# Patient Record
Sex: MALE | Race: WHITE | NOT HISPANIC OR LATINO | Employment: OTHER | URBAN - METROPOLITAN AREA
[De-identification: names, ages, dates, MRNs, and addresses within clinical notes are randomized per-mention and may not be internally consistent; named-entity substitution may affect disease eponyms.]

---

## 2023-12-14 ENCOUNTER — HOSPITAL ENCOUNTER (EMERGENCY)
Facility: HOSPITAL | Age: 68
Discharge: HOME | End: 2023-12-14
Attending: EMERGENCY MEDICINE
Payer: MEDICARE

## 2023-12-14 ENCOUNTER — APPOINTMENT (EMERGENCY)
Dept: RADIOLOGY | Facility: HOSPITAL | Age: 68
End: 2023-12-14
Payer: MEDICARE

## 2023-12-14 VITALS
RESPIRATION RATE: 18 BRPM | TEMPERATURE: 98.1 F | OXYGEN SATURATION: 97 % | SYSTOLIC BLOOD PRESSURE: 135 MMHG | HEART RATE: 84 BPM | HEIGHT: 70 IN | WEIGHT: 240 LBS | BODY MASS INDEX: 34.36 KG/M2 | DIASTOLIC BLOOD PRESSURE: 69 MMHG

## 2023-12-14 DIAGNOSIS — S41.112A ARM LACERATION, LEFT, INITIAL ENCOUNTER: ICD-10-CM
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
## 2023-12-14 PROCEDURE — 99284 EMERGENCY DEPT VISIT MOD MDM: CPT | Performed by: PHYSICIAN ASSISTANT

## 2023-12-14 PROCEDURE — 90715 TDAP VACCINE 7 YRS/> IM: CPT | Performed by: PHYSICIAN ASSISTANT

## 2023-12-14 PROCEDURE — 99283 EMERGENCY DEPT VISIT LOW MDM: CPT

## 2023-12-14 PROCEDURE — 73090 X-RAY EXAM OF FOREARM: CPT

## 2023-12-14 PROCEDURE — 12002 RPR S/N/AX/GEN/TRNK2.6-7.5CM: CPT | Performed by: PHYSICIAN ASSISTANT

## 2023-12-14 PROCEDURE — 90471 IMMUNIZATION ADMIN: CPT

## 2023-12-14 RX ORDER — IBUPROFEN 400 MG/1
400 TABLET ORAL EVERY 6 HOURS PRN
Qty: 30 TABLET | Refills: 0 | Status: SHIPPED | OUTPATIENT
Start: 2023-12-14

## 2023-12-14 RX ORDER — ACETAMINOPHEN 325 MG/1
975 TABLET ORAL ONCE
Status: COMPLETED | OUTPATIENT
Start: 2023-12-14 | End: 2023-12-14

## 2023-12-14 RX ORDER — AMOXICILLIN AND CLAVULANATE POTASSIUM 875; 125 MG/1; MG/1
1 TABLET, FILM COATED ORAL EVERY 12 HOURS
Qty: 14 TABLET | Refills: 0 | Status: SHIPPED | OUTPATIENT
Start: 2023-12-14 | End: 2023-12-21

## 2023-12-14 RX ORDER — LIDOCAINE HYDROCHLORIDE AND EPINEPHRINE 10; 10 MG/ML; UG/ML
20 INJECTION, SOLUTION INFILTRATION; PERINEURAL ONCE
Status: COMPLETED | OUTPATIENT
Start: 2023-12-14 | End: 2023-12-14

## 2023-12-14 RX ORDER — IBUPROFEN 400 MG/1
800 TABLET ORAL ONCE
Status: COMPLETED | OUTPATIENT
Start: 2023-12-14 | End: 2023-12-14

## 2023-12-14 RX ORDER — ACETAMINOPHEN 500 MG
500 TABLET ORAL EVERY 6 HOURS PRN
Qty: 30 TABLET | Refills: 0 | Status: SHIPPED | OUTPATIENT
Start: 2023-12-14

## 2023-12-14 RX ADMIN — LIDOCAINE HYDROCHLORIDE,EPINEPHRINE BITARTRATE 20 ML: 10; .01 INJECTION, SOLUTION INFILTRATION; PERINEURAL at 17:07

## 2023-12-14 RX ADMIN — ACETAMINOPHEN 975 MG: 325 TABLET, FILM COATED ORAL at 15:57

## 2023-12-14 RX ADMIN — IBUPROFEN 800 MG: 400 TABLET, FILM COATED ORAL at 15:57

## 2023-12-14 RX ADMIN — TETANUS TOXOID, REDUCED DIPHTHERIA TOXOID AND ACELLULAR PERTUSSIS VACCINE, ADSORBED 0.5 ML: 5; 2.5; 8; 8; 2.5 SUSPENSION INTRAMUSCULAR at 15:58

## 2023-12-14 NOTE — ED PROVIDER NOTES
History  Chief Complaint   Patient presents with    Dog Bite     Pt to er with reports of granddaughter's dog biting him on his left forearm - dog is up to date on shots. Tetanus not up to date. Dog Bite  Contact animal:  Dog  Location:  Shoulder/arm  Shoulder/arm injury location:  L forearm  Time since incident:  1 hour  Pain details:     Quality:  Aching    Severity:  Mild    Timing:  Constant    Progression:  Unchanged  Incident location:  Another residence  Notifications:  None  Animal's rabies vaccination status:  Up to date  Animal in possession: yes    Tetanus status:  Out of date  Associated symptoms: no fever        None       History reviewed. No pertinent past medical history. History reviewed. No pertinent surgical history. History reviewed. No pertinent family history. I have reviewed and agree with the history as documented. E-Cigarette/Vaping     E-Cigarette/Vaping Substances     Social History     Tobacco Use    Smoking status: Never    Smokeless tobacco: Never   Substance Use Topics    Alcohol use: Never    Drug use: Never       Review of Systems   Constitutional:  Negative for fever. Musculoskeletal:  Positive for arthralgias. Skin:  Positive for wound. All other systems reviewed and are negative. Physical Exam  Physical Exam  Vitals reviewed. Constitutional:       Appearance: Normal appearance. HENT:      Head: Normocephalic. Right Ear: External ear normal.      Left Ear: External ear normal.      Nose: Nose normal.      Mouth/Throat:      Pharynx: Oropharynx is clear. Eyes:      Conjunctiva/sclera: Conjunctivae normal.   Cardiovascular:      Rate and Rhythm: Normal rate. Pulses: Normal pulses. Pulmonary:      Effort: Pulmonary effort is normal.   Abdominal:      General: Bowel sounds are normal.   Musculoskeletal:         General: Tenderness present. Normal range of motion. Arms:       Cervical back: Normal range of motion.    Skin:     General: Skin is warm. Capillary Refill: Capillary refill takes less than 2 seconds. Neurological:      General: No focal deficit present. Mental Status: He is alert and oriented to person, place, and time. Sensory: No sensory deficit. Motor: No weakness.    Psychiatric:         Mood and Affect: Mood normal.         Vital Signs  ED Triage Vitals   Temperature Pulse Respirations Blood Pressure SpO2   12/14/23 1433 12/14/23 1433 12/14/23 1433 12/14/23 1433 12/14/23 1433   98.1 °F (36.7 °C) 84 18 135/69 97 %      Temp Source Heart Rate Source Patient Position - Orthostatic VS BP Location FiO2 (%)   12/14/23 1433 12/14/23 1433 12/14/23 1433 12/14/23 1433 --   Oral Monitor Sitting Left arm       Pain Score       12/14/23 1557       6           Vitals:    12/14/23 1433   BP: 135/69   Pulse: 84   Patient Position - Orthostatic VS: Sitting         Visual Acuity      ED Medications  Medications   tetanus-diphtheria-acellular pertussis (BOOSTRIX) IM injection 0.5 mL (0.5 mL Intramuscular Given 12/14/23 1558)   ibuprofen (MOTRIN) tablet 800 mg (800 mg Oral Given 12/14/23 1557)   acetaminophen (TYLENOL) tablet 975 mg (975 mg Oral Given 12/14/23 1557)   lidocaine-epinephrine (XYLOCAINE/EPINEPHRINE) 1 %-1:100,000 injection 20 mL (20 mL Infiltration Given by Other 12/14/23 1707)       Diagnostic Studies  Results Reviewed       None                   XR forearm 2 views LEFT   ED Interpretation by Gatito Barrett PA-C (12/14 7867)   No bone involvment         by Denis Nice MD (44/26 [id number])                 Procedures  Universal Protocol:  Patient understanding: patient states understanding of the procedure being performed  Patient consent: the patient's understanding of the procedure matches consent given  Patient identity confirmed: verbally with patient and arm band  Laceration repair    Date/Time: 12/14/2023 4:51 PM    Performed by: Gatito Barrett PA-C  Authorized by: Gatito Barrett PA-C  Body area: upper extremity  Location details: left lower arm  Laceration length: 3 cm  Anesthesia: local infiltration    Anesthesia:  Local Anesthetic: lidocaine 1% with epinephrine      Procedure Details:  Skin closure: 4-0 nylon and glue  Number of sutures: 2  Approximation: loose  Approximation difficulty: simple  Dressing: 4x4 sterile gauze               ED Course                               SBIRT 22yo+      Flowsheet Row Most Recent Value   Initial Alcohol Screen: US AUDIT-C     1. How often do you have a drink containing alcohol? 0 Filed at: 12/14/2023 1433   2. How many drinks containing alcohol do you have on a typical day you are drinking? 0 Filed at: 12/14/2023 1433   3a. Male UNDER 65: How often do you have five or more drinks on one occasion? 0 Filed at: 12/14/2023 1433   3b. FEMALE Any Age, or MALE 65+: How often do you have 4 or more drinks on one occassion? 0 Filed at: 12/14/2023 1433   Audit-C Score 0 Filed at: 12/14/2023 1433   ANNETTE: How many times in the past year have you. .. Used an illegal drug or used a prescription medication for non-medical reasons? Never Filed at: 12/14/2023 1433                      Medical Decision Making  77-year-old male presents emergency department for dog bite to left forearm. Patient states that dog is daughter and granddaughters animal.  Dog is up-to-date with its immunizations. Patient states that he needs to be updated with his tetanus. X-rays obtained no bony involvement. Patient had a significant laceration to the left forearm dorsum radial side. 2 puncture marks on the volar forearm. After adequate local anesthetic the wound was cleaned with simple soap and water. Patient had skin flap which was approximated with glue. The open deep wound was approximated with 2 sutures loosely. The puncture marks on the volar forearm were allowed to heal on their own with no suture were applied. Patient educated on diagnosis and home management.   Patient educated that he should treat with antibiotics and antibiotics were sent to his known pharmacy. Educated on timing for healing and suture removal.  Educated on any signs or symptoms of infection to return to the emergency department. Patient had no changes of sensation at the hand. Had full  strength at the hand. Educated patient and wife of home management and if any concern to either follow-up with orthopedic hand and/or return to the emergency department. Patient and wife admit understanding and agreement. Amount and/or Complexity of Data Reviewed  Radiology: ordered and independent interpretation performed. Risk  OTC drugs. Prescription drug management. Disposition  Final diagnoses:   Dog bite, initial encounter   Arm laceration, left, initial encounter     Time reflects when diagnosis was documented in both MDM as applicable and the Disposition within this note       Time User Action Codes Description Comment    12/14/2023  4:50 PM Frantz Weber ShadyNohemy. 0XXA] Dog bite, initial encounter     12/14/2023  5:41 PM Frantz Lua Add [G38.004R] Arm laceration, left, initial encounter           ED Disposition       ED Disposition   Discharge    Condition   Stable    Date/Time   Thu Dec 14, 2023 [facility] discharge to home/self care.                    Follow-up Information       Follow up With Specialties Details [address], MD Orthopedic Surgery, Hand Surgery   [facility]  [phone]              Discharge Medication List as of 12/14/2023  5:42 PM        START taking these medications    Details   acetaminophen (TYLENOL) 500 mg tablet Take 1 tablet (500 mg total) by mouth every 6 (six) hours as needed for mild pain or moderate pain, Starting Thu 12/14/2023, Print      amoxicillin-clavulanate (AUGMENTIN) 875-125 mg per tablet Take 1 tablet by mouth every 12 (twelve) hours for 7 days, Starting Thu 12/14/2023, Until Thu 12/21/2023, Normal      ibuprofen (MOTRIN) 400 mg tablet Take 1 tablet (400 mg total) by mouth every 6 (six) hours as needed for mild pain or moderate pain, Starting Thu 12/14/2023, Print             No discharge procedures on file.     PDMP Review       None            ED Provider  Electronically Signed by             Ana Villagomez PA-C  12/15/23 5775

## 2024-10-10 ENCOUNTER — APPOINTMENT (OUTPATIENT)
Dept: URGENT CARE | Facility: CLINIC | Age: 69
End: 2024-10-10

## 2024-12-27 ENCOUNTER — HOSPITAL ENCOUNTER (EMERGENCY)
Facility: HOSPITAL | Age: 69
Discharge: HOME | End: 2024-12-27
Attending: EMERGENCY MEDICINE
Payer: MEDICARE

## 2024-12-27 ENCOUNTER — APPOINTMENT (OUTPATIENT)
Dept: RADIOLOGY | Facility: HOSPITAL | Age: 69
End: 2024-12-27
Payer: MEDICARE

## 2024-12-27 VITALS
OXYGEN SATURATION: 96 % | DIASTOLIC BLOOD PRESSURE: 79 MMHG | TEMPERATURE: 97.4 F | SYSTOLIC BLOOD PRESSURE: 122 MMHG | HEART RATE: 83 BPM | RESPIRATION RATE: 20 BRPM

## 2024-12-27 DIAGNOSIS — R05.9 COUGH: ICD-10-CM

## 2024-12-27 DIAGNOSIS — R93.89 ABNORMAL CXR: ICD-10-CM

## 2024-12-27 DIAGNOSIS — J18.9 PNEUMONIA: Primary | ICD-10-CM

## 2024-12-27 LAB
FLUAV AG UPPER RESP QL IA.RAPID: NEGATIVE
FLUBV AG UPPER RESP QL IA.RAPID: NEGATIVE
S PYO DNA THROAT QL NAA+PROBE: NOT DETECTED
SARS-COV+SARS-COV-2 AG RESP QL IA.RAPID: NEGATIVE

## 2024-12-27 PROCEDURE — 87804 INFLUENZA ASSAY W/OPTIC: CPT | Performed by: EMERGENCY MEDICINE

## 2024-12-27 PROCEDURE — 71046 X-RAY EXAM CHEST 2 VIEWS: CPT

## 2024-12-27 PROCEDURE — 87811 SARS-COV-2 COVID19 W/OPTIC: CPT | Performed by: EMERGENCY MEDICINE

## 2024-12-27 PROCEDURE — 99283 EMERGENCY DEPT VISIT LOW MDM: CPT

## 2024-12-27 PROCEDURE — 99284 EMERGENCY DEPT VISIT MOD MDM: CPT

## 2024-12-27 PROCEDURE — 87651 STREP A DNA AMP PROBE: CPT | Performed by: EMERGENCY MEDICINE

## 2024-12-27 RX ORDER — AMOXICILLIN 500 MG/1
1000 CAPSULE ORAL 3 TIMES DAILY
Qty: 30 CAPSULE | Refills: 0 | Status: SHIPPED | OUTPATIENT
Start: 2024-12-27 | End: 2025-01-01

## 2024-12-27 RX ORDER — AMOXICILLIN 250 MG/1
1000 CAPSULE ORAL ONCE
Status: COMPLETED | OUTPATIENT
Start: 2024-12-27 | End: 2024-12-27

## 2024-12-27 RX ADMIN — AMOXICILLIN 1000 MG: 250 CAPSULE ORAL at 15:29

## 2024-12-27 NOTE — ED PROVIDER NOTES
Time reflects when diagnosis was documented in both MDM as applicable and the Disposition within this note       Time User Action Codes Description Comment    12/27/2024  3:44 PM Aimee Pinon Add [J18.9] Pneumonia     12/27/2024  3:45 PM Aimee Pinon Add [R68.89] Flu-like symptoms     12/27/2024  3:45 PM EdvinygAimee quintero Remove [R68.89] Flu-like symptoms     12/27/2024  3:45 PM Aimee Pinon Add [R05.9] Cough     12/27/2024  3:47 PM EdvinygAimee quintero Add [R93.89] Abnormal CXR           ED Disposition       ED Disposition   Discharge    Condition   Stable    Date/Time   Fri Dec 27, 2024  3:44 PM    Comment   [facility] discharge to home/self care.                   Assessment & Plan       Medical Decision Making  Patient reporting 1 week of cough, sore throat, subjective fever. Patient is well-appearing, afebrile, SpO2 98% on RA, patient is in no acute respiratory distress. No tachypnea, wheezing. Patient has no chest pain, or shortness of breath. Though he recalls a past mention of possible COPD or asthma, he denies any chronic symptoms or use of respiratory medications. Differential diagnosis includes, but is not limited to, viral etiology, strep, cough, bronchitis, pneumonia, pleural effusion, etc.     COVID, flu, strep negative. Chest x-ray suspicious for left lower lobe pneumonia. Will provide the first dose of antibiotic in the ED. SpO2 remains 94-96% with ambulation. Patient reports he would like to proceed with outpatient therapy instead as he feels well. Will provide strict return precautions and emphasize follow up with PCP and pulmonology. Patient discharged in no acute distress and well-appearing.    Patient case was discussed with ED attending, Dr. Easton, who was agreeable patient treatment plan and disposition.    Amount and/or Complexity of Data Reviewed  Labs: ordered.  Radiology: independent interpretation performed.    Risk  Prescription drug management.             Medications   amoxicillin  "(AMOXIL) capsule 1,000 mg (1,000 mg Oral Given 12/27/24 1529)       ED Risk Strat Scores                                              History of Present Illness       Chief Complaint   Patient presents with    Flu Symptoms     Pt reports having cough, sore throat, fevers for the last week. Reports cough has gotten worst. Productive light brown sputum.        History reviewed. No pertinent past medical history.   History reviewed. No pertinent surgical history.   History reviewed. No pertinent family history.   Social History     Tobacco Use    Smoking status: Never    Smokeless tobacco: Never   Substance Use Topics    Alcohol use: Never    Drug use: Never      E-Cigarette/Vaping      E-Cigarette/Vaping Substances      I have reviewed and agree with the history as documented.     Patient is a 69-year-old male presenting to the emergency department with a 1 week history of cough, sore throat and subjective fever.  He describes the cough as occasionally productive of brown sputum.  He believes he was exposed to \"walking pneumonia.\"  The patient recalls being told in the past that he may have COPD or asthma but is not on any chronic respiratory medications.  He denies needing to use rescue inhalers or breathing treatments, as he has not experienced shortness of breath, dyspnea on exertion or other respiratory symptoms, even in the setting of current symptoms.  He denies chest pain, chest tightness, chills, or measured fevers but feels as though he has had a fever.      Flu Symptoms  Presenting symptoms: cough and fever (subjective)    Presenting symptoms: no headaches, no myalgias, no nausea, no shortness of breath and no vomiting    Associated symptoms: no chills        Review of Systems   Constitutional:  Positive for fever (subjective). Negative for chills.   Respiratory:  Positive for cough. Negative for chest tightness, shortness of breath and wheezing.    Cardiovascular:  Negative for chest pain. "   Gastrointestinal:  Negative for abdominal pain, nausea and vomiting.   Musculoskeletal:  Negative for arthralgias and myalgias.   Neurological:  Negative for dizziness, weakness and headaches.   All other systems reviewed and are negative.          Objective       ED Triage Vitals [12/27/24 1305]   Temperature Pulse Blood Pressure Respirations SpO2 Patient Position - Orthostatic VS   (!) 97.4 °F (36.3 °C) 76 146/80 20 98 % Sitting      Temp Source Heart Rate Source BP Location FiO2 (%) Pain Score    Temporal Monitor Right arm -- 6      Vitals      Date and Time Temp Pulse SpO2 Resp BP Pain Score FACES Pain Rating User   12/27/24 1545 -- 83 96 % 20 122/79 -- --    12/27/24 1305 97.4 °F (36.3 °C) 76 98 % 20 146/80 6 --             Physical Exam  Vitals and nursing note reviewed.   Constitutional:       General: He is not in acute distress.     Appearance: Normal appearance. He is well-developed. He is not ill-appearing.   HENT:      Head: Normocephalic and atraumatic.      Mouth/Throat:      Mouth: Mucous membranes are moist.   Eyes:      Conjunctiva/sclera: Conjunctivae normal.   Cardiovascular:      Rate and Rhythm: Normal rate and regular rhythm.   Pulmonary:      Effort: Pulmonary effort is normal. No respiratory distress.      Breath sounds: No stridor. Rales (mild, L) present. No wheezing.   Chest:      Chest wall: No tenderness.   Abdominal:      Palpations: Abdomen is soft.      Tenderness: There is no abdominal tenderness.   Musculoskeletal:         General: No swelling.      Cervical back: Neck supple.   Skin:     General: Skin is warm and dry.      Capillary Refill: Capillary refill takes less than 2 seconds.   Neurological:      Mental Status: He is alert and oriented to person, place, and time.   Psychiatric:         Mood and Affect: Mood normal.         Results Reviewed       Procedure Component Value Units Date/Time    Strep A PCR [[id number]]  (Normal) Collected: 12/27/24 1309    Lab Status: Final  result Specimen: Throat Updated: 12/27/24 1344     STREP A PCR Not Detected    FLU/COVID Rapid Antigen (30 min. TAT) - Preferred screening test in ED [[id number]]  (Normal) Collected: 12/27/24 1309    Lab Status: Final result Specimen: Nares from Nose Updated: 12/27/24 1336     SARS COV Rapid Antigen Negative     Influenza A Rapid Antigen Negative     Influenza B Rapid Antigen Negative    Narrative:      This test has been performed using the [facility] Rola 2 FLU+SARS Antigen test under the Emergency Use Authorization (EUA). This test has been validated by the  and verified by the performing laboratory. The Rola uses lateral flow immunofluorescent sandwich assay to detect SARS-COV, Influenza A and Influenza B Antigen.     The [facility] Rola 2 SARS Antigen test does not differentiate between SARS-CoV and SARS-CoV-2.     Negative results are presumptive and may be confirmed with a molecular assay, if necessary, for patient management. Negative results do not rule out SARS-CoV-2 or influenza infection and should not be used as the sole basis for treatment or patient management decisions. A negative test result may occur if the level of antigen in a sample is below the limit of detection of this test.     Positive results are indicative of the presence of viral antigens, but do not rule out bacterial infection or co-infection with other viruses.     All test results should be used as an adjunct to clinical observations and other information available to the provider.    FOR PEDIATRIC PATIENTS - copy/paste COVID Guidelines URL to browser: [email or web address]            XR chest pa and lateral   ED Interpretation by Aimee Pinon PA-C (12/27 2468)   Suspicious for LLL PNA. Formal radiology reading pending.      Final Interpretation by Turner River MD (12/27 7692)      Hazy left lung base opacity which may be due to pneumonia in the appropriate clinical  setting. There is also peripheral reticular opacities which are nonspecific and could also be related to infection, volume overload, or interstitial disease. Correlate    with prior outside imaging.            Workstation performed: [id number]             Procedures    ED Medication and Procedure Management   Prior to Admission Medications   Prescriptions Last Dose Informant Patient Reported? Taking?   acetaminophen (TYLENOL) 500 mg tablet   No No   Sig: Take 1 tablet (500 mg total) by mouth every 6 (six) hours as needed for mild pain or moderate pain   ibuprofen (MOTRIN) 400 mg tablet   No No   Sig: Take 1 tablet (400 mg total) by mouth every 6 (six) hours as needed for mild pain or moderate pain      Facility-Administered Medications: None     Discharge Medication List as of 12/27/2024  3:48 PM        START taking these medications    Details   amoxicillin (AMOXIL) 500 mg capsule Take 2 capsules (1,000 mg total) by mouth 3 (three) times a day for 5 days, Starting Fri 12/27/2024, Until Wed 1/1/2025, Normal           CONTINUE these medications which have NOT CHANGED    Details   acetaminophen (TYLENOL) 500 mg tablet Take 1 tablet (500 mg total) by mouth every 6 (six) hours as needed for mild pain or moderate pain, Starting Thu 12/14/2023, Print      ibuprofen (MOTRIN) 400 mg tablet Take 1 tablet (400 mg total) by mouth every 6 (six) hours as needed for mild pain or moderate pain, Starting Thu 12/14/2023, Print             ED SEPSIS DOCUMENTATION   Time reflects when diagnosis was documented in both MDM as applicable and the Disposition within this note       Time User Action Codes Description Comment    12/27/2024  3:44 PM Aimee Pinon Add [J18.9] Pneumonia     12/27/2024  3:45 PM Aimee Pinon Add [R68.89] Flu-like symptoms     12/27/2024  3:45 PM Aimee Pinon Remove [R68.89] Flu-like symptoms     12/27/2024  3:45 PM Aimee Pinon Add [R05.9] Cough     12/27/2024  3:47 PM Aimee Pinon Add [R93.89] Abnormal CXR                   Aimee Pinon PA-C  12/29/24 1549

## 2024-12-27 NOTE — DISCHARGE INSTRUCTIONS
"Please take the antibiotic that has been sent to your pharmacy.      Please follow-up with your primary care provider in regards to the most recent chest x-ray today: \"Hazy left lung base opacity which may be due to pneumonia in the appropriate clinical setting. There is also peripheral reticular opacities which are nonspecific and could also be related to infection, volume overload, or interstitial disease.\"    Additionally, a referral to pulmonology was placed for you.  Please follow-up.    Please return to the emergency department if your symptoms are persisting, worsening, if you develop a fever, chills, shortness of breath, chest pain, any new or worsening symptoms.  "

## 2025-01-15 ENCOUNTER — CONSULT (OUTPATIENT)
Age: 70
End: 2025-01-15
Payer: MEDICARE

## 2025-01-15 VITALS
DIASTOLIC BLOOD PRESSURE: 82 MMHG | BODY MASS INDEX: 34.22 KG/M2 | SYSTOLIC BLOOD PRESSURE: 138 MMHG | TEMPERATURE: 97.6 F | WEIGHT: 239 LBS | HEIGHT: 70 IN | OXYGEN SATURATION: 95 % | HEART RATE: 79 BPM

## 2025-01-15 DIAGNOSIS — J18.9 PNEUMONIA: ICD-10-CM

## 2025-01-15 DIAGNOSIS — G47.33 OSA (OBSTRUCTIVE SLEEP APNEA): ICD-10-CM

## 2025-01-15 DIAGNOSIS — R93.89 ABNORMAL CXR: ICD-10-CM

## 2025-01-15 DIAGNOSIS — J45.20 MILD INTERMITTENT ASTHMA WITHOUT COMPLICATION: ICD-10-CM

## 2025-01-15 DIAGNOSIS — E66.01 MORBID (SEVERE) OBESITY DUE TO EXCESS CALORIES (HCC): Primary | ICD-10-CM

## 2025-01-15 DIAGNOSIS — F17.211 CIGARETTE NICOTINE DEPENDENCE IN REMISSION: ICD-10-CM

## 2025-01-15 PROCEDURE — 99204 OFFICE O/P NEW MOD 45 MIN: CPT | Performed by: INTERNAL MEDICINE

## 2025-01-15 RX ORDER — HYDROCODONE BITARTRATE AND ACETAMINOPHEN 5; 325 MG/1; MG/1
1 TABLET ORAL EVERY 6 HOURS PRN
COMMUNITY

## 2025-01-15 RX ORDER — TAMSULOSIN HYDROCHLORIDE 0.4 MG/1
0.8 CAPSULE ORAL
COMMUNITY

## 2025-01-15 RX ORDER — ROSUVASTATIN CALCIUM 20 MG/1
20 TABLET, COATED ORAL DAILY
COMMUNITY

## 2025-01-15 RX ORDER — ALBUTEROL SULFATE 90 UG/1
2 INHALANT RESPIRATORY (INHALATION) EVERY 4 HOURS PRN
COMMUNITY

## 2025-01-15 NOTE — ASSESSMENT & PLAN NOTE
BMI 34.  He has lost 60 pounds.  This has helped significantly with his breathing and sleep symptoms

## 2025-01-15 NOTE — PROGRESS NOTES
Name: Chong Chaney III      : 1955      MRN: [id number]  Encounter Provider: Aquilino Vega MD  Encounter Date: 1/15/2025   Encounter department: [facility]  :  Assessment & Plan  Abnormal CXR  Abnormal chest x-ray during ED visit in 2024.  Previous chest x-ray seems to suggest some reticular findings and possible emphysema.  I reviewed this chest x-ray and there are abnormal findings in peripheral opacities, reticulations that I think warrant a chest CT to rule out interstitial lung disease or pulmonary fibrosis.  He has smoking and occupational exposure    He should wait a month to acquire the CT to ensure that acute infectious findings are resolved  Follow up to be determined by results of CT chest  Orders:    Ambulatory Referral to Pulmonology    CT chest without contrast; Future    Pneumonia  He had respiratory symptoms suggestive of pneumonia or bronchitis and presented to the ED 2024.  He took a course of Augmentin and he feels better.  Residual dry cough but otherwise most of the symptoms have resolved  Orders:    Ambulatory Referral to Pulmonology    CT chest without contrast; Future    Morbid (severe) obesity due to excess calories (HCC)  BMI 34.  He has lost 60 pounds.  This has helped significantly with his breathing and sleep symptoms       Mild intermittent asthma without complication  Off maintenance inhalers for many years  Well controlled after weight loss  No daily symptoms       SABA (obstructive sleep apnea)  Previously diagnosed with SABA and was on a CPAP for 4 years.  Currently off of CPAP after 60 pound weight loss and resolution of sleep apnea symptoms.       Cigarette nicotine dependence in remission  More than 30-pack-year smoking history but quit in            History of Present Illness   Chong Chaney III is a 69 y.o. male who presents for evaluation of abnormal chest X-ray    Patient has a smoking history for more than 30 years but  quit in 1990.  Used to get recurrent episodes of bronchitis and symptoms suggestive of asthma even after quitting smoking such as wheezing and coughing.  He was on Symbicort and albuterol in the past and had to use it frequently.  However he over the past 5-year he has lost more than 60 pounds and no longer need inhalers.  He no longer has significant dyspnea or dyspnea on exertion.  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  No  service or other exposure history.  He has a dog at home and not exposed to farm environment or other animals.    He had a sick contact from a coworker around [place] time and developed cough, sputum, flulike symptoms.  He went to the ED on 12/27 and was prescribed Augmentin and had a chest x-ray performed.  The chest x-ray showed reticular opacities in the periphery as well as possible left-sided opacity.    He finished his antibiotics and now feels significantly better.  He still has a dry cough that is intermittent and happens with deep breathing.  Otherwise no longer has flulike symptoms, fatigue, fever.  No sputum production.    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    Used to has SABA and used CPAP and stopped using it due to weight loss.  No symptoms of sleep apnea such as gasping/choking.  Not sleepy during the day.     Review of Systems  Past Medical History   History reviewed. No pertinent past medical history.  History reviewed. No pertinent surgical history.  History reviewed. No pertinent family history.   reports that he has never smoked. He has never used smokeless tobacco. He reports that he does not drink alcohol and does not use drugs.  Current Outpatient Medications on File Prior to Visit   Medication Sig Dispense Refill    acetaminophen (TYLENOL) 500 mg tablet Take 1 tablet (500 mg total) by mouth every 6 (six) hours as needed for mild pain or moderate pain 30  "tablet 0    HYDROcodone-acetaminophen (NORCO) 5-325 mg per tablet Take 1 tablet by mouth every 6 (six) hours as needed      ibuprofen (MOTRIN) 400 mg tablet Take 1 tablet (400 mg total) by mouth every 6 (six) hours as needed for mild pain or moderate pain 30 tablet 0    tamsulosin (FLOMAX) 0.4 mg Take 0.8 mg by mouth daily at bedtime      albuterol (PROVENTIL HFA,VENTOLIN HFA) 90 mcg/act inhaler Inhale 2 puffs every 4 (four) hours as needed (Patient not taking: Reported on 1/15/2025)      rosuvastatin (CRESTOR) 20 MG tablet Take 20 mg by mouth daily (Patient not taking: Reported on 1/15/2025)       No current facility-administered medications on file prior to visit.   No Known Allergies      Historical Information       Objective   /82 (BP Location: Left arm, Patient Position: Sitting, Cuff Size: Standard)   Pulse 79   Temp 97.6 °F (36.4 °C) (Tympanic)   Ht 5' 10\" (1.778 m)   Wt 108 kg (239 lb)   SpO2 95%   BMI 34.29 kg/m²      Physical Exam  Vitals and nursing note reviewed.   Constitutional:       General: He is not in acute distress.     Appearance: Normal appearance. He is well-developed. He is not ill-appearing, toxic-appearing or diaphoretic.   HENT:      Head: Normocephalic and atraumatic.   Eyes:      Conjunctiva/sclera: Conjunctivae normal.   Cardiovascular:      Rate and Rhythm: Normal rate and regular rhythm.   Pulmonary:      Effort: Pulmonary effort is normal. No respiratory distress.      Breath sounds: No stridor. No wheezing, rhonchi or rales.   Abdominal:      Tenderness: There is no guarding.   Musculoskeletal:      Cervical back: Normal range of motion. No rigidity.   Neurological:      General: No focal deficit present.      Mental Status: He is alert and oriented to person, place, and time. Mental status is at baseline.   Psychiatric:         Mood and Affect: Mood normal.       Lab Results: I have reviewed pertinent labs.    Radiology Results Review: I personally reviewed the " following image studies in PACS and associated radiology reports: chest xray. My interpretation of the radiology images/reports is: Bilateral reticular and possible groundglass opacities in the peripheries.  Left-sided opacity.  Other Study Results: Other Study Results Review : No additional pertinent studies reviewed.  PFT Results Reviewed: NA

## 2025-01-25 ENCOUNTER — HOSPITAL ENCOUNTER (EMERGENCY)
Facility: HOSPITAL | Age: 70
Discharge: HOME | End: 2025-01-25
Payer: MEDICARE

## 2025-01-25 VITALS
OXYGEN SATURATION: 96 % | HEART RATE: 83 BPM | SYSTOLIC BLOOD PRESSURE: 115 MMHG | TEMPERATURE: 98.8 F | DIASTOLIC BLOOD PRESSURE: 72 MMHG | RESPIRATION RATE: 17 BRPM

## 2025-01-25 DIAGNOSIS — U07.1 COVID: ICD-10-CM

## 2025-01-25 DIAGNOSIS — R68.89 FLU-LIKE SYMPTOMS: Primary | ICD-10-CM

## 2025-01-25 DIAGNOSIS — B34.9 VIRAL SYNDROME: ICD-10-CM

## 2025-01-25 LAB
ALBUMIN SERPL BCG-MCNC: 4.1 G/DL (ref 3.5–5)
ALP SERPL-CCNC: 35 U/L (ref 34–104)
ALT SERPL W P-5'-P-CCNC: 16 U/L (ref 7–52)
ANION GAP SERPL CALCULATED.3IONS-SCNC: 8 MMOL/L (ref 4–13)
AST SERPL W P-5'-P-CCNC: 17 U/L (ref 13–39)
BASOPHILS # BLD AUTO: 0.02 THOUSANDS/ΜL (ref 0–0.1)
BASOPHILS NFR BLD AUTO: 0 % (ref 0–1)
BILIRUB SERPL-MCNC: 0.73 MG/DL (ref 0.2–1)
BUN SERPL-MCNC: 12 MG/DL (ref 5–25)
CALCIUM SERPL-MCNC: 8.7 MG/DL (ref 8.4–10.2)
CHLORIDE SERPL-SCNC: 99 MMOL/L (ref 96–108)
CO2 SERPL-SCNC: 25 MMOL/L (ref 21–32)
CREAT SERPL-MCNC: 0.92 MG/DL (ref 0.6–1.3)
EOSINOPHIL # BLD AUTO: 0.12 THOUSAND/ΜL (ref 0–0.61)
EOSINOPHIL NFR BLD AUTO: 2 % (ref 0–6)
ERYTHROCYTE [DISTWIDTH] IN BLOOD BY AUTOMATED COUNT: 12.8 % (ref 11.6–15.1)
FLUAV AG UPPER RESP QL IA.RAPID: NEGATIVE
FLUBV AG UPPER RESP QL IA.RAPID: NEGATIVE
GFR SERPL CREATININE-BSD FRML MDRD: 85 ML/MIN/1.73SQ M
GLUCOSE SERPL-MCNC: 100 MG/DL (ref 65–140)
HCT VFR BLD AUTO: 47.3 % (ref 36.5–49.3)
HGB BLD-MCNC: 15.9 G/DL (ref 12–17)
IMM GRANULOCYTES # BLD AUTO: 0.02 THOUSAND/UL (ref 0–0.2)
IMM GRANULOCYTES NFR BLD AUTO: 0 % (ref 0–2)
LYMPHOCYTES # BLD AUTO: 1.24 THOUSANDS/ΜL (ref 0.6–4.47)
LYMPHOCYTES NFR BLD AUTO: 19 % (ref 14–44)
MCH RBC QN AUTO: 30.5 PG (ref 26.8–34.3)
MCHC RBC AUTO-ENTMCNC: 33.6 G/DL (ref 31.4–37.4)
MCV RBC AUTO: 91 FL (ref 82–98)
MONOCYTES # BLD AUTO: 0.68 THOUSAND/ΜL (ref 0.17–1.22)
MONOCYTES NFR BLD AUTO: 11 % (ref 4–12)
NEUTROPHILS # BLD AUTO: 4.38 THOUSANDS/ΜL (ref 1.85–7.62)
NEUTS SEG NFR BLD AUTO: 68 % (ref 43–75)
NRBC BLD AUTO-RTO: 0 /100 WBCS
PLATELET # BLD AUTO: 107 THOUSANDS/UL (ref 149–390)
PMV BLD AUTO: 10.8 FL (ref 8.9–12.7)
POTASSIUM SERPL-SCNC: 3.9 MMOL/L (ref 3.5–5.3)
PROT SERPL-MCNC: 7.2 G/DL (ref 6.4–8.4)
RBC # BLD AUTO: 5.21 MILLION/UL (ref 3.88–5.62)
S PYO DNA THROAT QL NAA+PROBE: NOT DETECTED
SARS-COV+SARS-COV-2 AG RESP QL IA.RAPID: POSITIVE
SODIUM SERPL-SCNC: 132 MMOL/L (ref 135–147)
WBC # BLD AUTO: 6.46 THOUSAND/UL (ref 4.31–10.16)

## 2025-01-25 PROCEDURE — 96361 HYDRATE IV INFUSION ADD-ON: CPT

## 2025-01-25 PROCEDURE — 87651 STREP A DNA AMP PROBE: CPT

## 2025-01-25 PROCEDURE — 99283 EMERGENCY DEPT VISIT LOW MDM: CPT

## 2025-01-25 PROCEDURE — 99284 EMERGENCY DEPT VISIT MOD MDM: CPT

## 2025-01-25 PROCEDURE — 93005 ELECTROCARDIOGRAM TRACING: CPT

## 2025-01-25 PROCEDURE — 80053 COMPREHEN METABOLIC PANEL: CPT

## 2025-01-25 PROCEDURE — 85025 COMPLETE CBC W/AUTO DIFF WBC: CPT

## 2025-01-25 PROCEDURE — 36415 COLL VENOUS BLD VENIPUNCTURE: CPT

## 2025-01-25 PROCEDURE — 96374 THER/PROPH/DIAG INJ IV PUSH: CPT

## 2025-01-25 PROCEDURE — 87804 INFLUENZA ASSAY W/OPTIC: CPT

## 2025-01-25 PROCEDURE — 87811 SARS-COV-2 COVID19 W/OPTIC: CPT

## 2025-01-25 RX ORDER — ACETAMINOPHEN 325 MG/1
975 TABLET ORAL ONCE
Status: COMPLETED | OUTPATIENT
Start: 2025-01-25 | End: 2025-01-25

## 2025-01-25 RX ORDER — KETOROLAC TROMETHAMINE 30 MG/ML
15 INJECTION, SOLUTION INTRAMUSCULAR; INTRAVENOUS ONCE
Status: COMPLETED | OUTPATIENT
Start: 2025-01-25 | End: 2025-01-25

## 2025-01-25 RX ADMIN — SODIUM CHLORIDE 1000 ML: 0.9 INJECTION, SOLUTION INTRAVENOUS at 17:53

## 2025-01-25 RX ADMIN — ACETAMINOPHEN 975 MG: 325 TABLET, FILM COATED ORAL at 17:54

## 2025-01-25 RX ADMIN — KETOROLAC TROMETHAMINE 15 MG: 30 INJECTION, SOLUTION INTRAMUSCULAR; INTRAVENOUS at 17:54

## 2025-01-25 NOTE — DISCHARGE INSTRUCTIONS
Continue to take Tylenol and Motrin, or DayQuil/NyQuil at home as directed for fevers, chills, and discomfort.  Return to the emergency department if your symptoms get severely worse.  Otherwise follow-up with your primary care provider

## 2025-01-26 LAB
ATRIAL RATE: 94 BPM
P AXIS: 39 DEGREES
PR INTERVAL: 208 MS
QRS AXIS: 51 DEGREES
QRSD INTERVAL: 88 MS
QT INTERVAL: 356 MS
QTC INTERVAL: 445 MS
T WAVE AXIS: 50 DEGREES
VENTRICULAR RATE: 94 BPM

## 2025-01-26 PROCEDURE — 93010 ELECTROCARDIOGRAM REPORT: CPT | Performed by: INTERNAL MEDICINE

## 2025-01-26 NOTE — ED PROVIDER NOTES
Time reflects when diagnosis was documented in both MDM as applicable and the Disposition within this note       Time User Action Codes Description Comment    1/25/2025  6:16 PM Salomon Uriostegui Add [R68.89] Flu-like symptoms     1/25/2025  6:16 PM Salomon Uriostegui Add [B34.9] Viral syndrome     1/25/2025  6:16 PM Salomon Uriostegui Add [U07.1] COVID           ED Disposition       ED Disposition   Discharge    Condition   Stable    Date/Time   Sat Jan 25, 2025  6:16 PM    Comment   Chong Chaney III discharge to home/self care.                   Assessment & Plan       Medical Decision Making  Overall well-appearing 69-year-old male presenting with cold-like symptoms.  Will treat symptomatically in the emergency department patient stating that he has had poor p.o. intake over the last couple of days.  Given poor p.o..,  Age, will obtain CBC and CMP to make sure patient does not have any DAVID or other significant electrolyte abnormality.  If patient feeling improved after symptomatic management, will discharge    Amount and/or Complexity of Data Reviewed  Labs: ordered.    Risk  OTC drugs.  Prescription drug management.        ED Course as of 01/26/25 1319   Sat Jan 25, 2025   1746 Procedure Note: EKG  Date/Time: 01/25/25 5:46 PM   Interpreted by: Salomon Uriostegui  Indications / Diagnosis: SOB  ECG reviewed by me, the ED Provider: yes   The EKG demonstrates:  Rhythm: normal sinus  Intervals: normal intervals  Axis: normal axis  QRS/Blocks: normal QRS  ST Changes: No acute ST Changes, no STD/JOHN.         Medications   sodium chloride 0.9 % bolus 1,000 mL (0 mL Intravenous Stopped 1/25/25 1830)   acetaminophen (TYLENOL) tablet 975 mg (975 mg Oral Given 1/25/25 1754)   ketorolac (TORADOL) injection 15 mg (15 mg Intravenous Given 1/25/25 1754)       ED Risk Strat Scores                                              History of Present Illness       Chief Complaint   Patient presents with    Flu Symptoms     Pt states that two days ago  "\"I have a cough, I feel achy everywhere, and my throat hurts\" Reports fevers.        Past Medical History:   Diagnosis Date    Asthma       History reviewed. No pertinent surgical history.   History reviewed. No pertinent family history.   Social History     Tobacco Use    Smoking status: Never    Smokeless tobacco: Never   Substance Use Topics    Alcohol use: Never    Drug use: Never      E-Cigarette/Vaping      E-Cigarette/Vaping Substances      I have reviewed and agree with the history as documented.     Patient presents with:  Flu Symptoms: Pt states that two days ago \"I have a cough, I feel achy everywhere, and my throat hurts\" Reports fevers.     Patient is a 69-year-old male coming in for evaluation of 2 days of cough, sore throat, diffuse bodyaches, fevers.  Denies chest pain, shortness of breath, vomiting, diarrhea, or on review of systems        Review of Systems   All other systems reviewed and are negative.          Objective       ED Triage Vitals [01/25/25 1554]   Temperature Pulse Blood Pressure Respirations SpO2 Patient Position - Orthostatic VS   98.2 °F (36.8 °C) 97 116/70 22 94 % Sitting      Temp Source Heart Rate Source BP Location FiO2 (%) Pain Score    Temporal Monitor Left arm -- 4      Vitals      Date and Time Temp Pulse SpO2 Resp BP Pain Score FACES Pain Rating User   01/25/25 1830 -- -- -- -- 115/72 -- -- RN   01/25/25 1827 -- 83 96 % 17 -- -- -- RN   01/25/25 1754 -- -- -- -- -- 8 -- RN   01/25/25 1745 -- 87 96 % 20 124/73 -- -- RN   01/25/25 1734 98.8 °F (37.1 °C) 99 96 % -- 124/73 -- -- IA   01/25/25 1554 98.2 °F (36.8 °C) 97 94 % 22 116/70 4 --             Physical Exam  Vitals and nursing note reviewed.   Constitutional:       General: He is not in acute distress.     Appearance: He is well-developed.   HENT:      Head: Normocephalic and atraumatic.      Mouth/Throat:      Pharynx: No oropharyngeal exudate.   Eyes:      Conjunctiva/sclera: Conjunctivae normal.   Cardiovascular:    "   Rate and Rhythm: Normal rate and regular rhythm.      Heart sounds: No murmur heard.  Pulmonary:      Effort: Pulmonary effort is normal. No respiratory distress.      Breath sounds: Normal breath sounds.   Abdominal:      Palpations: Abdomen is soft.      Tenderness: There is no abdominal tenderness.   Musculoskeletal:         General: No swelling.      Cervical back: Neck supple.   Skin:     General: Skin is warm and dry.      Capillary Refill: Capillary refill takes less than 2 seconds.   Neurological:      Mental Status: He is alert.   Psychiatric:         Mood and Affect: Mood normal.         Results Reviewed       Procedure Component Value Units Date/Time    Comprehensive metabolic panel [[id number]]  (Abnormal) Collected: 01/25/25 1752    Lab Status: Final result Specimen: Blood from Arm, Right Updated: 01/25/25 1814     Sodium 132 mmol/L      Potassium 3.9 mmol/L      Chloride 99 mmol/L      CO2 25 mmol/L      ANION GAP 8 mmol/L      BUN 12 mg/dL      Creatinine 0.92 mg/dL      Glucose 100 mg/dL      Calcium 8.7 mg/dL      AST 17 U/L      ALT 16 U/L      Alkaline Phosphatase 35 U/L      Total Protein 7.2 g/dL      Albumin 4.1 g/dL      Total Bilirubin 0.73 mg/dL      eGFR 85 ml/min/1.73sq m     Narrative:      National Kidney Disease Foundation guidelines for Chronic Kidney Disease (CKD):     Stage 1 with normal or high GFR (GFR > 90 mL/min/1.73 square meters)    Stage 2 Mild CKD (GFR = 60-89 mL/min/1.73 square meters)    Stage 3A Moderate CKD (GFR = 45-59 mL/min/1.73 square meters)    Stage 3B Moderate CKD (GFR = 30-44 mL/min/1.73 square meters)    Stage 4 Severe CKD (GFR = 15-29 mL/min/1.73 square meters)    Stage 5 End Stage CKD (GFR <15 mL/min/1.73 square meters)  Note: GFR calculation is accurate only with a steady state creatinine    CBC and differential [[id number]]  (Abnormal) Collected: 01/25/25 1752    Lab Status: Final result Specimen: Blood from Arm, Right Updated: 01/25/25 1811     WBC 6.46  Thousand/uL      RBC 5.21 Million/uL      Hemoglobin 15.9 g/dL      Hematocrit 47.3 %      MCV 91 fL      MCH 30.5 pg      MCHC 33.6 g/dL      RDW 12.8 %      MPV 10.8 fL      Platelets 107 Thousands/uL      nRBC 0 /100 WBCs      Segmented % 68 %      Immature Grans % 0 %      Lymphocytes % 19 %      Monocytes % 11 %      Eosinophils Relative 2 %      Basophils Relative 0 %      Absolute Neutrophils 4.38 Thousands/µL      Absolute Immature Grans 0.02 Thousand/uL      Absolute Lymphocytes 1.24 Thousands/µL      Absolute Monocytes 0.68 Thousand/µL      Eosinophils Absolute 0.12 Thousand/µL      Basophils Absolute 0.02 Thousands/µL     Strep A PCR [[id number]]  (Normal) Collected: 01/25/25 1558    Lab Status: Final result Specimen: Throat Updated: 01/25/25 1629     STREP A PCR Not Detected    FLU/COVID Rapid Antigen (30 min. TAT) - Preferred screening test in ED [[id number]]  (Abnormal) Collected: 01/25/25 1558    Lab Status: Final result Specimen: Nares from Nose Updated: 01/25/25 1619     SARS COV Rapid Antigen Positive     Influenza A Rapid Antigen Negative     Influenza B Rapid Antigen Negative    Narrative:      This test has been performed using the Quidel Rola 2 FLU+SARS Antigen test under the Emergency Use Authorization (EUA). This test has been validated by the  and verified by the performing laboratory. The Rola uses lateral flow immunofluorescent sandwich assay to detect SARS-COV, Influenza A and Influenza B Antigen.     The Quidel Rola 2 SARS Antigen test does not differentiate between SARS-CoV and SARS-CoV-2.     Negative results are presumptive and may be confirmed with a molecular assay, if necessary, for patient management. Negative results do not rule out SARS-CoV-2 or influenza infection and should not be used as the sole basis for treatment or patient management decisions. A negative test result may occur if the level of antigen in a sample is below the limit of detection of this test.      Positive results are indicative of the presence of viral antigens, but do not rule out bacterial infection or co-infection with other viruses.     All test results should be used as an adjunct to clinical observations and other information available to the provider.    FOR PEDIATRIC PATIENTS - copy/paste COVID Guidelines URL to browser: [email or web address]            No orders to display       Procedures    ED Medication and Procedure Management   Prior to Admission Medications   Prescriptions Last Dose Informant Patient Reported? Taking?   HYDROcodone-acetaminophen (NORCO) 5-325 mg per tablet   Yes No   Sig: Take 1 tablet by mouth every 6 (six) hours as needed   acetaminophen (TYLENOL) 500 mg tablet   No No   Sig: Take 1 tablet (500 mg total) by mouth every 6 (six) hours as needed for mild pain or moderate pain   albuterol (PROVENTIL HFA,VENTOLIN HFA) 90 mcg/act inhaler   Yes No   Sig: Inhale 2 puffs every 4 (four) hours as needed   Patient not taking: Reported on 1/15/2025   ibuprofen (MOTRIN) 400 mg tablet   No No   Sig: Take 1 tablet (400 mg total) by mouth every 6 (six) hours as needed for mild pain or moderate pain   rosuvastatin (CRESTOR) 20 MG tablet   Yes No   Sig: Take 20 mg by mouth daily   Patient not taking: Reported on 1/15/2025   tamsulosin (FLOMAX) 0.4 mg   Yes No   Sig: Take 0.8 mg by mouth daily at bedtime      Facility-Administered Medications: None     Discharge Medication List as of 1/25/2025  6:18 PM        CONTINUE these medications which have NOT CHANGED    Details   acetaminophen (TYLENOL) 500 mg tablet Take 1 tablet (500 mg total) by mouth every 6 (six) hours as needed for mild pain or moderate pain, Starting Thu 12/14/2023, Print      albuterol (PROVENTIL HFA,VENTOLIN HFA) 90 mcg/act inhaler Inhale 2 puffs every 4 (four) hours as needed, Historical Med      HYDROcodone-acetaminophen (NORCO) 5-325 mg per tablet Take 1 tablet by mouth  every 6 (six) hours as needed, Historical Med      ibuprofen (MOTRIN) 400 mg tablet Take 1 tablet (400 mg total) by mouth every 6 (six) hours as needed for mild pain or moderate pain, Starting u 12/14/2023, Print      rosuvastatin (CRESTOR) 20 MG tablet Take 20 mg by mouth daily, Historical Med      tamsulosin (FLOMAX) 0.4 mg Take 0.8 mg by mouth daily at bedtime, Historical Med           No discharge procedures on file.  ED SEPSIS DOCUMENTATION   Time reflects when diagnosis was documented in both MDM as applicable and the Disposition within this note       Time User Action Codes Description Comment    1/25/2025  6:16 PM Salomon Uriostegui [R68.89] Flu-like symptoms     1/25/2025  6:16 PM Salomon Uriostegui [B34.9] Viral syndrome     1/25/2025  6:16 PM Salomon Uriostegui [U07.1] JASON Uriostegui MD  01/26/25 5382

## 2025-02-04 ENCOUNTER — APPOINTMENT (EMERGENCY)
Dept: RADIOLOGY | Facility: HOSPITAL | Age: 70
End: 2025-02-04
Payer: MEDICARE

## 2025-02-04 ENCOUNTER — HOSPITAL ENCOUNTER (EMERGENCY)
Facility: HOSPITAL | Age: 70
Discharge: HOME | End: 2025-02-04
Attending: EMERGENCY MEDICINE | Admitting: EMERGENCY MEDICINE
Payer: MEDICARE

## 2025-02-04 VITALS
TEMPERATURE: 98.7 F | HEIGHT: 70 IN | HEART RATE: 84 BPM | DIASTOLIC BLOOD PRESSURE: 61 MMHG | SYSTOLIC BLOOD PRESSURE: 111 MMHG | BODY MASS INDEX: 32.21 KG/M2 | OXYGEN SATURATION: 93 % | WEIGHT: 225 LBS | RESPIRATION RATE: 18 BRPM

## 2025-02-04 DIAGNOSIS — J18.9 PNEUMONIA: Primary | ICD-10-CM

## 2025-02-04 LAB
ALBUMIN SERPL BCG-MCNC: 4.2 G/DL (ref 3.5–5)
ALP SERPL-CCNC: 39 U/L (ref 34–104)
ALT SERPL W P-5'-P-CCNC: 14 U/L (ref 7–52)
ANION GAP SERPL CALCULATED.3IONS-SCNC: 8 MMOL/L (ref 4–13)
AST SERPL W P-5'-P-CCNC: 13 U/L (ref 13–39)
BASOPHILS # BLD AUTO: 0.03 THOUSANDS/ΜL (ref 0–0.1)
BASOPHILS NFR BLD AUTO: 0 % (ref 0–1)
BILIRUB SERPL-MCNC: 0.78 MG/DL (ref 0.2–1)
BUN SERPL-MCNC: 9 MG/DL (ref 5–25)
CALCIUM SERPL-MCNC: 9 MG/DL (ref 8.4–10.2)
CARDIAC TROPONIN I PNL SERPL HS: 4 NG/L (ref ?–50)
CHLORIDE SERPL-SCNC: 101 MMOL/L (ref 96–108)
CO2 SERPL-SCNC: 25 MMOL/L (ref 21–32)
CREAT SERPL-MCNC: 0.77 MG/DL (ref 0.6–1.3)
EOSINOPHIL # BLD AUTO: 0.14 THOUSAND/ΜL (ref 0–0.61)
EOSINOPHIL NFR BLD AUTO: 2 % (ref 0–6)
ERYTHROCYTE [DISTWIDTH] IN BLOOD BY AUTOMATED COUNT: 12.8 % (ref 11.6–15.1)
FLUAV RNA RESP QL NAA+PROBE: NEGATIVE
FLUBV RNA RESP QL NAA+PROBE: NEGATIVE
GFR SERPL CREATININE-BSD FRML MDRD: 93 ML/MIN/1.73SQ M
GLUCOSE SERPL-MCNC: 109 MG/DL (ref 65–140)
HCT VFR BLD AUTO: 45.5 % (ref 36.5–49.3)
HGB BLD-MCNC: 15.4 G/DL (ref 12–17)
HOLD SPECIMEN: NORMAL
IMM GRANULOCYTES # BLD AUTO: 0.05 THOUSAND/UL (ref 0–0.2)
IMM GRANULOCYTES NFR BLD AUTO: 1 % (ref 0–2)
LYMPHOCYTES # BLD AUTO: 1 THOUSANDS/ΜL (ref 0.6–4.47)
LYMPHOCYTES NFR BLD AUTO: 13 % (ref 14–44)
MCH RBC QN AUTO: 30.8 PG (ref 26.8–34.3)
MCHC RBC AUTO-ENTMCNC: 33.8 G/DL (ref 31.4–37.4)
MCV RBC AUTO: 91 FL (ref 82–98)
MONOCYTES # BLD AUTO: 0.63 THOUSAND/ΜL (ref 0.17–1.22)
MONOCYTES NFR BLD AUTO: 8 % (ref 4–12)
NEUTROPHILS # BLD AUTO: 5.96 THOUSANDS/ΜL (ref 1.85–7.62)
NEUTS SEG NFR BLD AUTO: 76 % (ref 43–75)
NRBC BLD AUTO-RTO: 0 /100 WBCS
PLATELET # BLD AUTO: 142 THOUSANDS/UL (ref 149–390)
PMV BLD AUTO: 10.3 FL (ref 8.9–12.7)
POTASSIUM SERPL-SCNC: 4.2 MMOL/L (ref 3.5–5.3)
PROT SERPL-MCNC: 6.7 G/DL (ref 6.4–8.4)
RBC # BLD AUTO: 5 MILLION/UL (ref 3.88–5.62)
RSV RNA RESP QL NAA+PROBE: NEGATIVE
SARS-COV-2 RNA RESP QL NAA+PROBE: NEGATIVE
SODIUM SERPL-SCNC: 134 MMOL/L (ref 135–147)
WBC # BLD AUTO: 7.81 THOUSAND/UL (ref 4.31–10.16)

## 2025-02-04 PROCEDURE — 84484 ASSAY OF TROPONIN QUANT: CPT | Performed by: EMERGENCY MEDICINE

## 2025-02-04 PROCEDURE — 85025 COMPLETE CBC W/AUTO DIFF WBC: CPT | Performed by: EMERGENCY MEDICINE

## 2025-02-04 PROCEDURE — 71046 X-RAY EXAM CHEST 2 VIEWS: CPT

## 2025-02-04 PROCEDURE — 99284 EMERGENCY DEPT VISIT MOD MDM: CPT | Performed by: EMERGENCY MEDICINE

## 2025-02-04 PROCEDURE — 93005 ELECTROCARDIOGRAM TRACING: CPT

## 2025-02-04 PROCEDURE — 80053 COMPREHEN METABOLIC PANEL: CPT | Performed by: EMERGENCY MEDICINE

## 2025-02-04 PROCEDURE — 0241U HB NFCT DS VIR RESP RNA 4 TRGT: CPT | Performed by: EMERGENCY MEDICINE

## 2025-02-04 PROCEDURE — 99284 EMERGENCY DEPT VISIT MOD MDM: CPT

## 2025-02-04 PROCEDURE — 36415 COLL VENOUS BLD VENIPUNCTURE: CPT

## 2025-02-04 RX ORDER — CEFUROXIME AXETIL 500 MG/1
500 TABLET ORAL EVERY 12 HOURS SCHEDULED
Qty: 14 TABLET | Refills: 0 | Status: SHIPPED | OUTPATIENT
Start: 2025-02-04 | End: 2025-02-11

## 2025-02-04 RX ORDER — AZITHROMYCIN 250 MG/1
TABLET, FILM COATED ORAL
Qty: 6 TABLET | Refills: 0 | Status: SHIPPED | OUTPATIENT
Start: 2025-02-04 | End: 2025-02-08

## 2025-02-04 RX ORDER — CEFUROXIME AXETIL 250 MG/1
500 TABLET ORAL ONCE
Status: COMPLETED | OUTPATIENT
Start: 2025-02-04 | End: 2025-02-04

## 2025-02-04 RX ORDER — AZITHROMYCIN 500 MG/1
500 TABLET, FILM COATED ORAL ONCE
Status: COMPLETED | OUTPATIENT
Start: 2025-02-04 | End: 2025-02-04

## 2025-02-04 RX ADMIN — CEFUROXIME AXETIL 500 MG: 250 TABLET, FILM COATED ORAL at 16:26

## 2025-02-04 RX ADMIN — AZITHROMYCIN DIHYDRATE 500 MG: 500 TABLET ORAL at 16:26

## 2025-02-05 LAB
ATRIAL RATE: 94 BPM
P AXIS: 42 DEGREES
PR INTERVAL: 214 MS
QRS AXIS: 50 DEGREES
QRSD INTERVAL: 92 MS
QT INTERVAL: 350 MS
QTC INTERVAL: 438 MS
T WAVE AXIS: 54 DEGREES
VENTRICULAR RATE: 94 BPM

## 2025-02-05 PROCEDURE — 93010 ELECTROCARDIOGRAM REPORT: CPT | Performed by: INTERNAL MEDICINE

## 2025-02-05 NOTE — ED PROVIDER NOTES
Time reflects when diagnosis was documented in both MDM as applicable and the Disposition within this note       Time User Action Codes Description Comment    2/4/2025  4:31 PM Eduardo Roy [J18.9] Pneumonia           ED Disposition       ED Disposition   Discharge    Condition   Stable    Date/Time   Tue Feb 4, 2025  4:31 PM    Comment   Chong Chaney III discharge to home/self care.                   Assessment & Plan       Medical Decision Making  69-year-old male presenting with cough, body aches.  COVID-positive last week.  Cardiopulmonary evaluation, chest x-ray consistent with pneumonia.  Will administer antibiotics.  Return precautions discussed.  Follow-up with PCP.    Amount and/or Complexity of Data Reviewed  Labs: ordered.  Radiology: ordered.    Risk  Prescription drug management.             Medications   cefuroxime (CEFTIN) tablet 500 mg (500 mg Oral Given 2/4/25 1626)   azithromycin (ZITHROMAX) tablet 500 mg (500 mg Oral Given 2/4/25 1626)       ED Risk Strat Scores   HEART Risk Score      Flowsheet Row Most Recent Value   Heart Score Risk Calculator    History 0 Filed at: 02/04/2025 2118   ECG 1 Filed at: 02/04/2025 2118   Age 2 Filed at: 02/04/2025 2118   Risk Factors 1 Filed at: 02/04/2025 2118   Troponin 0 Filed at: 02/04/2025 2118   HEART Score 4 Filed at: 02/04/2025 2118          HEART Risk Score      Flowsheet Row Most Recent Value   Heart Score Risk Calculator    History 0 Filed at: 02/04/2025 2118   ECG 1 Filed at: 02/04/2025 2118   Age 2 Filed at: 02/04/2025 2118   Risk Factors 1 Filed at: 02/04/2025 2118   Troponin 0 Filed at: 02/04/2025 2118   HEART Score 4 Filed at: 02/04/2025 2118                            SBIRT 22yo+      Flowsheet Row Most Recent Value   Initial Alcohol Screen: US AUDIT-C     1. How often do you have a drink containing alcohol? 0 Filed at: 02/04/2025 1536   2. How many drinks containing alcohol do you have on a typical day you are drinking?  0 Filed at:  02/04/2025 1536   3a. Male UNDER 65: How often do you have five or more drinks on one occasion? 0 Filed at: 02/04/2025 1536   3b. FEMALE Any Age, or MALE 65+: How often do you have 4 or more drinks on one occassion? 0 Filed at: 02/04/2025 1536   Audit-C Score 0 Filed at: 02/04/2025 1536   ANNETTE: How many times in the past year have you...    Used an illegal drug or used a prescription medication for non-medical reasons? Never Filed at: 02/04/2025 1536                            History of Present Illness       Chief Complaint   Patient presents with    Flu Symptoms     Pt to ED c/o body aches, cough, congestion, sore throat. States he was here 2 weeks ago for pna. Pt reports symptoms started today.        Past Medical History:   Diagnosis Date    Asthma       History reviewed. No pertinent surgical history.   History reviewed. No pertinent family history.   Social History     Tobacco Use    Smoking status: Never    Smokeless tobacco: Never   Vaping Use    Vaping status: Never Used   Substance Use Topics    Alcohol use: Never    Drug use: Never      E-Cigarette/Vaping    E-Cigarette Use Never User       E-Cigarette/Vaping Substances    Nicotine No     THC No     CBD No     Flavoring No     Other No     Unknown No       I have reviewed and agree with the history as documented.     69-year-old male diagnosed with COVID last week and presents for evaluation of new onset cough and bodyaches.  Patient reports he had a positive COVID test last week but then symptoms fully resolved 2 days ago.  Then earlier today started again with cough, body aches.  Denies chest pain, shortness of breath.        Review of Systems   Constitutional:  Positive for fatigue and fever.           Objective       ED Triage Vitals   Temperature Pulse Blood Pressure Respirations SpO2 Patient Position - Orthostatic VS   02/04/25 1452 02/04/25 1452 02/04/25 1452 02/04/25 1452 02/04/25 1452 02/04/25 1540   98.7 °F (37.1 °C) 97 126/72 19 97 % Sitting       Temp Source Heart Rate Source BP Location FiO2 (%) Pain Score    02/04/25 1452 02/04/25 1452 02/04/25 1452 -- 02/04/25 1452    Oral Monitor Left arm  8      Vitals      Date and Time Temp Pulse SpO2 Resp BP Pain Score FACES Pain Rating User   02/04/25 1615 -- 84 93 % 18 111/61 -- -- RD   02/04/25 1545 -- 76 96 % 18 137/73 -- -- RD   02/04/25 1540 -- 79 98 % 18 137/73 -- -- RD   02/04/25 1452 98.7 °F (37.1 °C) 97 97 % 19 126/72 8 -- AD            Physical Exam  Vitals and nursing note reviewed.   Constitutional:       General: He is not in acute distress.     Appearance: He is well-developed.   HENT:      Head: Normocephalic and atraumatic.      Right Ear: External ear normal.      Left Ear: External ear normal.      Nose: Nose normal.   Eyes:      General: No scleral icterus.  Pulmonary:      Effort: Pulmonary effort is normal. No respiratory distress.   Abdominal:      General: There is no distension.      Palpations: Abdomen is soft.   Musculoskeletal:         General: No deformity. Normal range of motion.      Cervical back: Normal range of motion and neck supple.   Skin:     General: Skin is warm.      Findings: No rash.   Neurological:      General: No focal deficit present.      Mental Status: He is alert.      Gait: Gait normal.   Psychiatric:         Mood and Affect: Mood normal.         Results Reviewed       Procedure Component Value Units Date/Time    HS Troponin 0hr (reflex protocol) [[id number]]  (Normal) Collected: 02/04/25 1456    Lab Status: Final result Specimen: Blood from Arm, Right Updated: 02/04/25 1603     hs TnI 0hr 4 ng/L     [place] draw [[id number]] Collected: 02/04/25 1456    Lab Status: Final result Specimen: Blood from Arm, Right Updated: 02/04/25 1601    Narrative:      The following orders were created for panel order [place] draw.  Procedure                               Abnormality         Status                     ---------                               -----------         ------                      Light Blue Top on hold[[id number]]                           Final result               Gold top on hold[[id number]]                                 Final result               Green / Black tube on hold[[id number]]                       Final result                 Please view results for these tests on the individual orders.    FLU/RSV/COVID - if FLU/RSV clinically relevant (2hr TAT) [[id number]]  (Normal) Collected: 02/04/25 1456    Lab Status: Final result Specimen: Nares from Nose Updated: 02/04/25 1539     SARS-CoV-2 Negative     INFLUENZA A PCR Negative     INFLUENZA B PCR Negative     RSV PCR Negative    Narrative:      This test has been performed using the CoV-2/Flu/RSV plus assay on the [employer] platform. This test has been validated by the  and verified by the performing laboratory.     This test is designed to amplify and detect the following: nucleocapsid (N), envelope (E), and RNA-dependent RNA polymerase (RdRP) genes of the SARS-CoV-2 genome; matrix (M), basic polymerase (PB2), and acidic protein (PA) segments of the influenza A genome; matrix (M) and non-structural protein (NS) segments of the influenza B genome, and the nucleocapsid genes of RSV A and RSV B.     Positive results are indicative of the presence of Flu A, Flu B, RSV, and/or SARS-CoV-2 RNA. Positive results for SARS-CoV-2 or suspected novel influenza should be reported to state, local, or federal health departments according to local reporting requirements.      All results should be assessed in conjunction with clinical presentation and other laboratory markers for clinical management.     FOR PEDIATRIC PATIENTS - copy/paste COVID Guidelines URL to browser: [email or web address]       Comprehensive metabolic panel [[id number]]  (Abnormal) Collected: 02/04/25 1456    Lab Status: Final result Specimen: Blood from Arm, Right Updated: 02/04/25 1105      Sodium 134 mmol/L      Potassium 4.2 mmol/L      Chloride 101 mmol/L      CO2 25 mmol/L      ANION GAP 8 mmol/L      BUN 9 mg/dL      Creatinine 0.77 mg/dL      Glucose 109 mg/dL      Calcium 9.0 mg/dL      AST 13 U/L      ALT 14 U/L      Alkaline Phosphatase 39 U/L      Total Protein 6.7 g/dL      Albumin 4.2 g/dL      Total Bilirubin 0.78 mg/dL      eGFR 93 ml/min/1.73sq m     Narrative:      National Kidney Disease Foundation guidelines for Chronic Kidney Disease (CKD):     Stage 1 with normal or high GFR (GFR > 90 mL/min/1.73 square meters)    Stage 2 Mild CKD (GFR = 60-89 mL/min/1.73 square meters)    Stage 3A Moderate CKD (GFR = 45-59 mL/min/1.73 square meters)    Stage 3B Moderate CKD (GFR = 30-44 mL/min/1.73 square meters)    Stage 4 Severe CKD (GFR = 15-29 mL/min/1.73 square meters)    Stage 5 End Stage CKD (GFR <15 mL/min/1.73 square meters)  Note: GFR calculation is accurate only with a steady state creatinine    CBC and differential [[id number]]  (Abnormal) Collected: 02/04/25 1456    Lab Status: Final result Specimen: Blood from Arm, Right Updated: 02/04/25 1503     WBC 7.81 Thousand/uL      RBC 5.00 Million/uL      Hemoglobin 15.4 g/dL      Hematocrit 45.5 %      MCV 91 fL      MCH 30.8 pg      MCHC 33.8 g/dL      RDW 12.8 %      MPV 10.3 fL      Platelets 142 Thousands/uL      nRBC 0 /100 WBCs      Segmented % 76 %      Immature Grans % 1 %      Lymphocytes % 13 %      Monocytes % 8 %      Eosinophils Relative 2 %      Basophils Relative 0 %      Absolute Neutrophils 5.96 Thousands/µL      Absolute Immature Grans 0.05 Thousand/uL      Absolute Lymphocytes 1.00 Thousands/µL      Absolute Monocytes 0.63 Thousand/µL      Eosinophils Absolute 0.14 Thousand/µL      Basophils Absolute 0.03 Thousands/µL             XR chest 2 views   Final Interpretation by Eliseo Alexandra MD (02/04 1610)      Increased bilateral mid and lower lung opacities can represent increased pneumonia and/or scarring.             Workstation performed: [id number]             Procedures    ED Medication and Procedure Management   Prior to Admission Medications   Prescriptions Last Dose Informant Patient Reported? Taking?   HYDROcodone-acetaminophen (NORCO) 5-325 mg per tablet   Yes No   Sig: Take 1 tablet by mouth every 6 (six) hours as needed   acetaminophen (TYLENOL) 500 mg tablet   No No   Sig: Take 1 tablet (500 mg total) by mouth every 6 (six) hours as needed for mild pain or moderate pain   albuterol (PROVENTIL HFA,VENTOLIN HFA) 90 mcg/act inhaler   Yes No   Sig: Inhale 2 puffs every 4 (four) hours as needed   Patient not taking: Reported on 1/15/2025   ibuprofen (MOTRIN) 400 mg tablet   No No   Sig: Take 1 tablet (400 mg total) by mouth every 6 (six) hours as needed for mild pain or moderate pain   rosuvastatin (CRESTOR) 20 MG tablet   Yes No   Sig: Take 20 mg by mouth daily   Patient not taking: Reported on 1/15/2025   tamsulosin (FLOMAX) 0.4 mg   Yes No   Sig: Take 0.8 mg by mouth daily at bedtime      Facility-Administered Medications: None     Discharge Medication List as of 2/4/2025  4:32 PM        START taking these medications    Details   azithromycin (ZITHROMAX) 250 mg tablet Take 1 tablet daily x 4 days, Normal      cefuroxime (CEFTIN) 500 mg tablet Take 1 tablet (500 mg total) by mouth every 12 (twelve) hours for 7 days, Starting Tue 2/4/2025, Until Tue 2/11/2025, Normal           CONTINUE these medications which have NOT CHANGED    Details   acetaminophen (TYLENOL) 500 mg tablet Take 1 tablet (500 mg total) by mouth every 6 (six) hours as needed for mild pain or moderate pain, Starting u 12/14/2023, Print      albuterol (PROVENTIL HFA,VENTOLIN HFA) 90 mcg/act inhaler Inhale 2 puffs every 4 (four) hours as needed, Historical Med      HYDROcodone-acetaminophen (NORCO) 5-325 mg per tablet Take 1 tablet by mouth every 6 (six) hours as needed, Historical Med      ibuprofen (MOTRIN) 400 mg tablet Take 1 tablet (400 mg total)  by mouth every 6 (six) hours as needed for mild pain or moderate pain, Starting Thu 12/14/2023, Print      rosuvastatin (CRESTOR) 20 MG tablet Take 20 mg by mouth daily, Historical Med      tamsulosin (FLOMAX) 0.4 mg Take 0.8 mg by mouth daily at bedtime, Historical Med           No discharge procedures on file.  ED SEPSIS DOCUMENTATION   Time reflects when diagnosis was documented in both MDM as applicable and the Disposition within this note       Time User Action Codes Description Comment    2/4/2025  4:31 PM Eduardo Roy Add [J18.9] Pneumonia                  Eduardo Roy DO  02/04/25 2118

## 2025-02-14 ENCOUNTER — HOSPITAL ENCOUNTER (OUTPATIENT)
Dept: CT IMAGING | Facility: HOSPITAL | Age: 70
Discharge: HOME | End: 2025-02-14
Attending: INTERNAL MEDICINE
Payer: MEDICARE

## 2025-02-14 DIAGNOSIS — R93.89 ABNORMAL CXR: ICD-10-CM

## 2025-02-14 DIAGNOSIS — J18.9 PNEUMONIA: ICD-10-CM

## 2025-02-14 PROCEDURE — 71250 CT THORAX DX C-: CPT

## 2025-02-19 DIAGNOSIS — R91.1 LUNG NODULE SEEN ON IMAGING STUDY: Primary | ICD-10-CM

## 2025-02-19 NOTE — PROGRESS NOTES
I called Chong about his CT scan.  He can get a follow-up CT scan in 6 months.  Does not need follow-up with me right now since he feels pretty good from a respiratory standpoint.    If having worsening emphysema on imaging despite not smoking, or worsening respiratory symptoms in the future I recommend pulmonary function testing and ongoing follow-up with pulmonary.  As far as coronary calcifications I recommend follow-up with his PCP to discuss whether he needs a cardiology referral or going back on rosuvastatin and reducing heart disease risk factors

## 2025-05-07 ENCOUNTER — HOSPITAL ENCOUNTER (OUTPATIENT)
Dept: ULTRASOUND IMAGING | Facility: HOSPITAL | Age: 70
Discharge: HOME | End: 2025-05-07
Attending: FAMILY MEDICINE
Payer: MEDICARE

## 2025-05-07 DIAGNOSIS — Z13.6 SCREENING FOR AAA (AORTIC ABDOMINAL ANEURYSM): ICD-10-CM

## 2025-05-07 PROCEDURE — 76706 US ABDL AORTA SCREEN AAA: CPT

## 2025-05-08 ENCOUNTER — TELEPHONE (OUTPATIENT)
Dept: CARDIOLOGY CLINIC | Facility: CLINIC | Age: 70
End: 2025-05-08

## 2025-05-09 ENCOUNTER — TELEPHONE (OUTPATIENT)
Age: 70
End: 2025-05-09

## 2025-05-09 NOTE — TELEPHONE ENCOUNTER
Chong called to confirm his labs are scanned in before his consult with Dr. Thornton. I was able to located the labs under a cardiology note and under media. Patient informed.

## 2025-05-27 ENCOUNTER — NURSE TRIAGE (OUTPATIENT)
Age: 70
End: 2025-05-27

## 2025-05-27 NOTE — TELEPHONE ENCOUNTER
"REASON FOR CONVERSATION: Medication Problem  Pt is asking if Dr. Zamora will prescribe this medication for him. His previous cardiologist was killed in a car accident and the PCP will not order for him because the dose had been increased from 10 mg to 20 mg. The patient's new patient apt is not until 7/21/25 with Dr. Zamora and the pt is concerned not taking it for that long.     Did advised patient the providers don't usually prescribe medications until seen.    OTHER HEALTH INFORMATION: Lipid panel in chart from 4/15    PROTOCOL DISPOSITION: Callback by PCP Today    PRACTICE FOLLOW-UP: Advised the patient would inform the provider of request and pt call back     Reason for Disposition   Caller wants to use a complementary or alternative medicine    Answer Assessment - Initial Assessment Questions  1. NAME of MEDICINE: \"What medicine(s) are you calling about?\"      Rosuvastatin 20 mg tablet   2. QUESTION: \"What is your question?\" (e.g., double dose of medicine, side effect)      Pt is asking if Dr. Zamora will prescribe this medication for him. His previous cardiologist was killed in a car accident and the PCP will not order for him because the dose had been increased from 10 mg to 20 mg. The patients new patient apt is not until 7/21/25 with Dr. Zamora and the pt is concerned not taking it for that long.    Protocols used: Medication Question Call-Adult-[place]    "

## 2025-05-28 ENCOUNTER — TELEPHONE (OUTPATIENT)
Dept: CARDIOLOGY CLINIC | Facility: CLINIC | Age: 70
End: 2025-05-28

## 2025-05-28 DIAGNOSIS — E78.5 DYSLIPIDEMIA: Primary | ICD-10-CM

## 2025-05-28 RX ORDER — ROSUVASTATIN CALCIUM 20 MG/1
20 TABLET, COATED ORAL DAILY
Qty: 90 TABLET | Refills: 0 | Status: SHIPPED | OUTPATIENT
Start: 2025-05-28

## 2025-05-28 NOTE — TELEPHONE ENCOUNTER
Pt returned the call.  Pt is taking Rosuvastatin 20 mg daily and would like a 90 day supply sent to the [facility] on [address] [facility] in [place].  Thank you.

## 2025-05-28 NOTE — TELEPHONE ENCOUNTER
Called pt no answer message on machine to call back and let us know strength of medication quantity and also place to send, as well  to make a follow up appointment to see a cardiologist    Patient was contacted on 05/30/25 to notify of refill sent to his pharmacy, he will be seeing Dr. Zamora nx month.

## 2025-06-02 ENCOUNTER — OFFICE VISIT (OUTPATIENT)
Dept: HEMATOLOGY ONCOLOGY | Facility: CLINIC | Age: 70
End: 2025-06-02
Payer: MEDICARE

## 2025-06-02 VITALS
SYSTOLIC BLOOD PRESSURE: 124 MMHG | HEART RATE: 86 BPM | DIASTOLIC BLOOD PRESSURE: 98 MMHG | WEIGHT: 245 LBS | HEIGHT: 70 IN | BODY MASS INDEX: 35.07 KG/M2 | RESPIRATION RATE: 17 BRPM | TEMPERATURE: 98.1 F | OXYGEN SATURATION: 96 %

## 2025-06-02 DIAGNOSIS — D69.6 THROMBOCYTOPENIA (HCC): Primary | ICD-10-CM

## 2025-06-02 DIAGNOSIS — K86.2 PANCREATIC CYST: ICD-10-CM

## 2025-06-02 DIAGNOSIS — Z12.11 COLON CANCER SCREENING: ICD-10-CM

## 2025-06-02 PROCEDURE — G2211 COMPLEX E/M VISIT ADD ON: HCPCS | Performed by: NURSE PRACTITIONER

## 2025-06-02 PROCEDURE — 99204 OFFICE O/P NEW MOD 45 MIN: CPT | Performed by: NURSE PRACTITIONER

## 2025-06-02 RX ORDER — DIAZEPAM 5 MG/1
TABLET ORAL
COMMUNITY

## 2025-06-02 NOTE — PROGRESS NOTES
Name: Chong Chaney III      : 1955      MRN: [id number]  Encounter Provider: SHERIN Mckoy  Encounter Date: 2025   Encounter department: [facility] [place]  :  Assessment & Plan  Thrombocytopenia (HCC)   Patient likely has ITP, platelets range between 107-142, no active bruising or bleeding.  Observation with updated labs, I will contact him with results and see him in follow-up.  He does have a history of vitamin B12 deficiency, most recent level 414 in 2022.    Orders:    CBC and differential; Future    Vitamin B12; Future    Colon cancer screening   Patient is due for colonoscopy most recent was in 2018 in an outside system.  He has recently moved to the [facility] and would like to see someone in the [facility] system.  Orders:    Ambulatory referral to Gastroenterology; Future    Pancreatic cyst    Orders:    Ambulatory referral to Gastroenterology; Future      Return in about 6 months (around 2025) for virtual video/telephone.    History of Present Illness   Chief Complaint   Patient presents with    Consult     Pertinent Medical History     25: Patient is a 69-year-old male with a history of asthma, BPH, COPD, GERD, elevated lipids, apathy, osteoarthritis, obesity, and thrombocytopenia dating back to at least .  He had previously followed with [place] [facility]ology and was last seen in 2022.  Per note workup negative, etiology unclear.  Platelets ranged between 107-142.  No active bleeding, occasional bruising noted.  Ultrasound of the abdomen in  showed enlarged liver 18.8 cm, probably on the basis of steatosis, no focal mass, spleen normal size 13.4 cm.  Repeat ultrasound on 2024 showed liver measuring 17.1 cm increased heterogeneous echogenicity, suggestive of fatty infiltration.  Spleen measuring 12.7 cm.  He also has a history of pancreatic 7 mm cystic lesion noted on MRI in 2020.     Review of  "Systems   Constitutional:  Negative for activity change, appetite change, fatigue, fever and unexpected weight change.   Respiratory:  Negative for cough and shortness of breath.    Cardiovascular:  Negative for chest pain and leg swelling.   Gastrointestinal:  Negative for abdominal pain, constipation, diarrhea and nausea.   Endocrine: Negative for cold intolerance and heat intolerance.   Musculoskeletal:  Negative for arthralgias and myalgias.   Skin: Negative.    Neurological:  Negative for dizziness, weakness and headaches.   Hematological:  Negative for adenopathy. Bruises/bleeds easily.       Objective   /98 (BP Location: Left arm, Patient Position: Sitting, Cuff Size: Adult)   Pulse 86   Temp 98.1 °F (36.7 °C) (Temporal)   Resp 17   Ht 5' 10\" (1.778 m)   Wt 111 kg (245 lb)   SpO2 96%   BMI 35.15 kg/m²     Physical Exam  Vitals reviewed.   Constitutional:       Appearance: Normal appearance. He is well-developed.   HENT:      Head: Normocephalic and atraumatic.     Eyes:      Pupils: Pupils are equal, round, and reactive to light.     Pulmonary:      Effort: Pulmonary effort is normal. No respiratory distress.     Musculoskeletal:         General: Normal range of motion.      Cervical back: Normal range of motion.   Lymphadenopathy:      Cervical: No cervical adenopathy.     Skin:     General: Skin is dry.     Neurological:      Mental Status: He is alert and oriented to person, place, and time.     Psychiatric:         Behavior: Behavior normal.         Labs: I have reviewed the following labs:  Results for orders placed or performed during the hospital encounter of 02/04/25   FLU/RSV/COVID - if FLU/RSV clinically relevant (2hr TAT)    Specimen: Nose; Nares   Result Value Ref Range    SARS-CoV-2 Negative Negative    INFLUENZA A PCR Negative Negative    INFLUENZA B PCR Negative Negative    RSV PCR Negative Negative   CBC and differential   Result Value Ref Range    WBC 7.81 4.31 - 10.16 " "Thousand/uL    RBC 5.00 3.88 - 5.62 Million/uL    Hemoglobin 15.4 12.0 - 17.0 g/dL    Hematocrit 45.5 36.5 - 49.3 %    MCV 91 82 - 98 fL    MCH 30.8 26.8 - 34.3 pg    MCHC 33.8 31.4 - 37.4 g/dL    RDW 12.8 11.6 - 15.1 %    MPV 10.3 8.9 - 12.7 fL    Platelets 142 (L) 149 - 390 Thousands/uL    nRBC 0 /100 WBCs    Segmented % 76 (H) 43 - 75 %    Immature Grans % 1 0 - 2 %    Lymphocytes % 13 (L) 14 - 44 %    Monocytes % 8 4 - 12 %    Eosinophils Relative 2 0 - 6 %    Basophils Relative 0 0 - 1 %    Absolute Neutrophils 5.96 1.85 - 7.62 Thousands/µL    Absolute Immature Grans 0.05 0.00 - 0.20 Thousand/uL    Absolute Lymphocytes 1.00 0.60 - 4.47 Thousands/µL    Absolute Monocytes 0.63 0.17 - 1.22 Thousand/µL    Eosinophils Absolute 0.14 0.00 - 0.61 Thousand/µL    Basophils Absolute 0.03 0.00 - 0.10 Thousands/µL   Comprehensive metabolic panel   Result Value Ref Range    Sodium 134 (L) 135 - 147 mmol/L    Potassium 4.2 3.5 - 5.3 mmol/L    Chloride 101 96 - 108 mmol/L    CO2 25 21 - 32 mmol/L    ANION GAP 8 4 - 13 mmol/L    BUN 9 5 - 25 mg/dL    Creatinine 0.77 0.60 - 1.30 mg/dL    Glucose 109 65 - 140 mg/dL    Calcium 9.0 8.4 - 10.2 mg/dL    AST 13 13 - 39 U/L    ALT 14 7 - 52 U/L    Alkaline Phosphatase 39 34 - 104 U/L    Total Protein 6.7 6.4 - 8.4 g/dL    Albumin 4.2 3.5 - 5.0 g/dL    Total Bilirubin 0.78 0.20 - 1.00 mg/dL    eGFR 93 ml/min/1.73sq m   HS Troponin 0hr (reflex protocol)   Result Value Ref Range    hs TnI 0hr 4 \"Refer to ACS Flowchart\"- see link ng/L   Green / Black tube on hold   Result Value Ref Range    Extra Tube Hold for add-ons.              "

## 2025-06-02 NOTE — LETTER
2025     Nirmala Seth DO  [address] [facility] [address]    Patient: Chong Chaney III   YOB: 1955   Date of Visit: 2025       Dear Dr. Nirmala Seth DO:    Thank you for referring Chong Chaney to me for evaluation. Below are my notes for this consultation.    If you have questions, please do not hesitate to call me. I look forward to following your patient along with you.         Sincerely,        SHERIN Mckoy        CC: No Recipients    SHERIN Mckoy  2025  9:54 AM  Sign when Signing Visit  Name: Chong Chaney III      : 1955      MRN: [id number]  Encounter Provider: SHERIN Mckoy  Encounter Date: 2025   Encounter department: [facility] [place]  :  Assessment & Plan  Thrombocytopenia (HCC)   Patient likely has ITP, platelets range between 107-142, no active bruising or bleeding.  Observation with updated labs, I will contact him with results and see him in follow-up.  He does have a history of vitamin B12 deficiency, most recent level 414 in 2022.    Orders:  •  CBC and differential; Future  •  Vitamin B12; Future    Colon cancer screening   Patient is due for colonoscopy most recent was in 2018 in an outside system.  He has recently moved to the [facility] and would like to see someone in the [facility] system.  Orders:  •  Ambulatory referral to Gastroenterology; Future    Pancreatic cyst    Orders:  •  Ambulatory referral to Gastroenterology; Future      Return in about 6 months (around 2025) for virtual video/telephone.    History of Present Illness  Chief Complaint   Patient presents with   • Consult     Pertinent Medical History    25: Patient is a 69-year-old male with a history of asthma, BPH, COPD, GERD, elevated lipids, apathy, osteoarthritis, obesity, and thrombocytopenia dating back to at least 2018.  He had previously followed with [place] [facility]ology and was  "last seen in August 2022.  Per note workup negative, etiology unclear.  Platelets ranged between 107-142.  No active bleeding, occasional bruising noted.  Ultrasound of the abdomen in 2020 showed enlarged liver 18.8 cm, probably on the basis of steatosis, no focal mass, spleen normal size 13.4 cm.  Repeat ultrasound on 2/6/2024 showed liver measuring 17.1 cm increased heterogeneous echogenicity, suggestive of fatty infiltration.  Spleen measuring 12.7 cm.  He also has a history of pancreatic 7 mm cystic lesion noted on MRI in September 2020.     Review of Systems   Constitutional:  Negative for activity change, appetite change, fatigue, fever and unexpected weight change.   Respiratory:  Negative for cough and shortness of breath.    Cardiovascular:  Negative for chest pain and leg swelling.   Gastrointestinal:  Negative for abdominal pain, constipation, diarrhea and nausea.   Endocrine: Negative for cold intolerance and heat intolerance.   Musculoskeletal:  Negative for arthralgias and myalgias.   Skin: Negative.    Neurological:  Negative for dizziness, weakness and headaches.   Hematological:  Negative for adenopathy. Bruises/bleeds easily.       Objective  /98 (BP Location: Left arm, Patient Position: Sitting, Cuff Size: Adult)   Pulse 86   Temp 98.1 °F (36.7 °C) (Temporal)   Resp 17   Ht 5' 10\" (1.778 m)   Wt 111 kg (245 lb)   SpO2 96%   BMI 35.15 kg/m²     Physical Exam  Vitals reviewed.   Constitutional:       Appearance: Normal appearance. He is well-developed.   HENT:      Head: Normocephalic and atraumatic.     Eyes:      Pupils: Pupils are equal, round, and reactive to light.     Pulmonary:      Effort: Pulmonary effort is normal. No respiratory distress.     Musculoskeletal:         General: Normal range of motion.      Cervical back: Normal range of motion.   Lymphadenopathy:      Cervical: No cervical adenopathy.     Skin:     General: Skin is dry.     Neurological:      Mental Status: He " is alert and oriented to person, place, and time.     Psychiatric:         Behavior: Behavior normal.         Labs: I have reviewed the following labs:  Results for orders placed or performed during the hospital encounter of 02/04/25   FLU/RSV/COVID - if FLU/RSV clinically relevant (2hr TAT)    Specimen: Nose; Nares   Result Value Ref Range    SARS-CoV-2 Negative Negative    INFLUENZA A PCR Negative Negative    INFLUENZA B PCR Negative Negative    RSV PCR Negative Negative   CBC and differential   Result Value Ref Range    WBC 7.81 4.31 - 10.16 Thousand/uL    RBC 5.00 3.88 - 5.62 Million/uL    Hemoglobin 15.4 12.0 - 17.0 g/dL    Hematocrit 45.5 36.5 - 49.3 %    MCV 91 82 - 98 fL    MCH 30.8 26.8 - 34.3 pg    MCHC 33.8 31.4 - 37.4 g/dL    RDW 12.8 11.6 - 15.1 %    MPV 10.3 8.9 - 12.7 fL    Platelets 142 (L) 149 - 390 Thousands/uL    nRBC 0 /100 WBCs    Segmented % 76 (H) 43 - 75 %    Immature Grans % 1 0 - 2 %    Lymphocytes % 13 (L) 14 - 44 %    Monocytes % 8 4 - 12 %    Eosinophils Relative 2 0 - 6 %    Basophils Relative 0 0 - 1 %    Absolute Neutrophils 5.96 1.85 - 7.62 Thousands/µL    Absolute Immature Grans 0.05 0.00 - 0.20 Thousand/uL    Absolute Lymphocytes 1.00 0.60 - 4.47 Thousands/µL    Absolute Monocytes 0.63 0.17 - 1.22 Thousand/µL    Eosinophils Absolute 0.14 0.00 - 0.61 Thousand/µL    Basophils Absolute 0.03 0.00 - 0.10 Thousands/µL   Comprehensive metabolic panel   Result Value Ref Range    Sodium 134 (L) 135 - 147 mmol/L    Potassium 4.2 3.5 - 5.3 mmol/L    Chloride 101 96 - 108 mmol/L    CO2 25 21 - 32 mmol/L    ANION GAP 8 4 - 13 mmol/L    BUN 9 5 - 25 mg/dL    Creatinine 0.77 0.60 - 1.30 mg/dL    Glucose 109 65 - 140 mg/dL    Calcium 9.0 8.4 - 10.2 mg/dL    AST 13 13 - 39 U/L    ALT 14 7 - 52 U/L    Alkaline Phosphatase 39 34 - 104 U/L    Total Protein 6.7 6.4 - 8.4 g/dL    Albumin 4.2 3.5 - 5.0 g/dL    Total Bilirubin 0.78 0.20 - 1.00 mg/dL    eGFR 93 ml/min/1.73sq m   HS Troponin 0hr (reflex  "protocol)   Result Value Ref Range    hs TnI 0hr 4 \"Refer to ACS Flowchart\"- see link ng/L   Green / Black tube on hold   Result Value Ref Range    Extra Tube Hold for add-ons.              "

## 2025-06-11 NOTE — PROGRESS NOTES
Name: Chong Chaney III      : 1955      MRN: [id number]  Encounter Provider: More Thornton MD  Encounter Date: 2025   Encounter department: [facility]    No chief complaint on file.  :  Assessment & Plan  Class 2 obesity         Prediabetes         Mixed hyperlipidemia         Patient is a 69yM who presents today for prediabetes management    1) Prediabetes:- we do not have any information for his recent A1C. Last A1C in 2018 was 5.8%. Patient is not on any medications. Discussed lifestyle changes and also importance of weight management. He did lose 100lbs naturally last year with lifestyle changes but unfortunately did gain 40lbs back. He will try IF again and work on weight management and hopefully this keeps his AC down.   Will check baseline A1C now and repeat in 6 months     2) Hyperlipidemia- lipid  was WNL, c/w crestor     3) Class 2 obesity- see above    RTC in 6 months     Pertinent Medical History     History of Present Illness     Chong Chaney III is a 69 y.o. male with Pmhx of class 2 obesity, hyperlipidemia who presents today to establish care for prediabetes.     Prediabetes- reports was following with endocrine at [place] before and now moved to wanted to establish care. We didn't get any old records. Patient does not recall his last A1C  Diet- reports was doing IF last year and lost 100lbs and then stopped and gained 40lbs back. Would want to lose further and get back on his diet plan  Exercise- reports used to go to gym and then stopped d/t life happened  Weight- lost 100lbs last year and then gained 40lbs back  Medications- no diabetic medications     Review of Systems   Constitutional:  Negative for diaphoresis, fatigue and unexpected weight change.   Respiratory:  Negative for shortness of breath.    Cardiovascular:  Negative for chest pain and palpitations.   Gastrointestinal:  Negative for constipation and diarrhea.   Endocrine:  "Negative for polydipsia and polyuria.    as per HPI       Medical History Reviewed by provider this encounter:     .    Objective   There were no vitals taken for this visit.     There is no height or weight on file to calculate BMI.  Wt Readings from Last 3 Encounters:   06/02/25 111 kg (245 lb)   02/04/25 102 kg (225 lb)   01/15/25 108 kg (239 lb)     Physical Exam  Vitals and nursing note reviewed.   Constitutional:       General: He is not in acute distress.     Appearance: He is well-developed.   HENT:      Head: Normocephalic and atraumatic.     Eyes:      Conjunctiva/sclera: Conjunctivae normal.       Cardiovascular:      Rate and Rhythm: Normal rate and regular rhythm.      Heart sounds: No murmur heard.  Pulmonary:      Effort: Pulmonary effort is normal. No respiratory distress.      Breath sounds: Normal breath sounds.   Abdominal:      Palpations: Abdomen is soft.      Tenderness: There is no abdominal tenderness.     Musculoskeletal:         General: No swelling.      Cervical back: Neck supple.     Skin:     General: Skin is warm and dry.      Capillary Refill: Capillary refill takes less than 2 seconds.     Neurological:      Mental Status: He is alert.     Psychiatric:         Mood and Affect: Mood normal.         Labs: I have reviewed pertinent labs including:   Lab Results   Component Value Date    HGBA1C 5.8 06/07/2018      Lab Results   Component Value Date    CREATININE 0.77 02/04/2025    CREATININE 0.92 01/25/2025    CREATININE 0.96 11/06/2020    BUN 9 02/04/2025    K 4.2 02/04/2025     02/04/2025    CO2 25 02/04/2025      EGFR   Date Value Ref Range Status   11/06/2020 79.11  Final     Comment:     Chronic Kidney Disease: eGFR <60 mL/min/1.73 sq.m.  Renal Failure: eGFR <15 mL/min/1.73 sq.m.     eGFR   Date Value Ref Range Status   02/04/2025 93 ml/min/1.73sq m Final      No results found for: \"CHOL\", \"HDL\", \"LDL\", \"TRIG\", \"CHOLHDL\"   Radiology Results Review : No pertinent imaging " studies reviewed.  There are no Patient Instructions on file for this visit.    Discussed with the patient and all questioned fully answered. He will call me if any problems arise.

## 2025-06-12 ENCOUNTER — APPOINTMENT (OUTPATIENT)
Dept: LAB | Facility: HOSPITAL | Age: 70
End: 2025-06-12
Payer: MEDICARE

## 2025-06-12 ENCOUNTER — OFFICE VISIT (OUTPATIENT)
Dept: ENDOCRINOLOGY | Facility: HOSPITAL | Age: 70
End: 2025-06-12
Payer: MEDICARE

## 2025-06-12 VITALS
OXYGEN SATURATION: 97 % | WEIGHT: 242 LBS | DIASTOLIC BLOOD PRESSURE: 70 MMHG | SYSTOLIC BLOOD PRESSURE: 120 MMHG | BODY MASS INDEX: 34.65 KG/M2 | HEART RATE: 96 BPM | HEIGHT: 70 IN

## 2025-06-12 DIAGNOSIS — E66.01 MORBID (SEVERE) OBESITY DUE TO EXCESS CALORIES (HCC): ICD-10-CM

## 2025-06-12 DIAGNOSIS — E78.2 MIXED HYPERLIPIDEMIA: ICD-10-CM

## 2025-06-12 DIAGNOSIS — R73.03 PREDIABETES: ICD-10-CM

## 2025-06-12 DIAGNOSIS — D69.6 THROMBOCYTOPENIA (HCC): ICD-10-CM

## 2025-06-12 DIAGNOSIS — E66.812 CLASS 2 OBESITY: ICD-10-CM

## 2025-06-12 DIAGNOSIS — E66.812 CLASS 2 OBESITY: Primary | ICD-10-CM

## 2025-06-12 LAB
BASOPHILS # BLD AUTO: 0.03 THOUSANDS/ÂΜL (ref 0–0.1)
BASOPHILS NFR BLD AUTO: 1 % (ref 0–1)
EOSINOPHIL # BLD AUTO: 0.12 THOUSAND/ÂΜL (ref 0–0.61)
EOSINOPHIL NFR BLD AUTO: 2 % (ref 0–6)
ERYTHROCYTE [DISTWIDTH] IN BLOOD BY AUTOMATED COUNT: 12.9 % (ref 11.6–15.1)
EST. AVERAGE GLUCOSE BLD GHB EST-MCNC: 120 MG/DL
HBA1C MFR BLD: 5.8 %
HCT VFR BLD AUTO: 46.3 % (ref 36.5–49.3)
HGB BLD-MCNC: 15.6 G/DL (ref 12–17)
IMM GRANULOCYTES # BLD AUTO: 0.03 THOUSAND/UL (ref 0–0.2)
IMM GRANULOCYTES NFR BLD AUTO: 1 % (ref 0–2)
LYMPHOCYTES # BLD AUTO: 1.31 THOUSANDS/ÂΜL (ref 0.6–4.47)
LYMPHOCYTES NFR BLD AUTO: 22 % (ref 14–44)
MCH RBC QN AUTO: 31.1 PG (ref 26.8–34.3)
MCHC RBC AUTO-ENTMCNC: 33.7 G/DL (ref 31.4–37.4)
MCV RBC AUTO: 92 FL (ref 82–98)
MONOCYTES # BLD AUTO: 0.58 THOUSAND/ÂΜL (ref 0.17–1.22)
MONOCYTES NFR BLD AUTO: 10 % (ref 4–12)
NEUTROPHILS # BLD AUTO: 3.94 THOUSANDS/ÂΜL (ref 1.85–7.62)
NEUTS SEG NFR BLD AUTO: 64 % (ref 43–75)
NRBC BLD AUTO-RTO: 0 /100 WBCS
PLATELET # BLD AUTO: 128 THOUSANDS/UL (ref 149–390)
PMV BLD AUTO: 12.2 FL (ref 8.9–12.7)
RBC # BLD AUTO: 5.02 MILLION/UL (ref 3.88–5.62)
VIT B12 SERPL-MCNC: 371 PG/ML (ref 180–914)
WBC # BLD AUTO: 6.01 THOUSAND/UL (ref 4.31–10.16)

## 2025-06-12 PROCEDURE — 99204 OFFICE O/P NEW MOD 45 MIN: CPT | Performed by: STUDENT IN AN ORGANIZED HEALTH CARE EDUCATION/TRAINING PROGRAM

## 2025-06-12 PROCEDURE — G2211 COMPLEX E/M VISIT ADD ON: HCPCS | Performed by: STUDENT IN AN ORGANIZED HEALTH CARE EDUCATION/TRAINING PROGRAM

## 2025-06-12 PROCEDURE — 36415 COLL VENOUS BLD VENIPUNCTURE: CPT

## 2025-06-12 PROCEDURE — 85025 COMPLETE CBC W/AUTO DIFF WBC: CPT

## 2025-06-12 PROCEDURE — 82607 VITAMIN B-12: CPT

## 2025-06-12 PROCEDURE — 83036 HEMOGLOBIN GLYCOSYLATED A1C: CPT

## 2025-06-12 RX ORDER — ASPIRIN 81 MG/1
81 TABLET ORAL DAILY
COMMUNITY

## 2025-06-13 ENCOUNTER — RESULTS FOLLOW-UP (OUTPATIENT)
Dept: ENDOCRINOLOGY | Facility: HOSPITAL | Age: 70
End: 2025-06-13

## 2025-07-20 PROBLEM — E78.5 DYSLIPIDEMIA: Status: ACTIVE | Noted: 2025-07-20

## 2025-07-20 PROBLEM — I25.10 NONOBSTRUCTIVE ATHEROSCLEROSIS OF CORONARY ARTERY: Status: ACTIVE | Noted: 2025-07-20

## 2025-07-20 NOTE — PROGRESS NOTES
Cardiology Consultation     Chong Chaney III  [id number]  1955  HEART & VASCULAR [facility] [place]  [address]  [facility] [place] [id number]      Assessment & Plan  Nonobstructive atherosclerosis of coronary artery  -  30-40% mid LAD, 20% RCA - [facility] 2019 at [facility]   -  No records available, will obtain results from prior cardiologist through [facility]   -  Continues to endorse exertional shortness of breath and chest heaviness at rest and with exertion  -  Exercise nuclear stress test recommended  -  Recommend daily aspirin, does not take consistently  -  No recent assessment of lipids, prescription provided  DUONG (dyspnea on exertion)    Orders:    NM myocardial perfusion spect (stress and/or rest); Future    Dyslipidemia  -  No recent assessment of lipids available for review  -  Reports rosuvastatin dose escalated in recent past  -  Repeat lipid panel requested  Exercise-induced leg fatigue  -  Reports exercise-induced leg fatigue  -  Lower extremity arterial duplex requested  -  Also with history of venous insufficiency with prior vein stripping  -  Discussed referral to vascular surgery once lower extremity arterial duplex complete  Morbid (severe) obesity due to excess calories (HCC)    Orders:    VAS ARTERIAL DUPLEX- LOWER LIMB BILATERAL; Future      History of Present Illness: Mr. Chaney is a pleasant 69-year-old gentleman who presents to the office today to establish care.    He had a heart catheterization at [facility] in 2019 revealing nonobstructive coronary artery disease in the form of a 30 to 40% mid LAD stenosis and a 20% RCA stenosis.  He reports symptoms of chest heaviness and shortness of breath which prompted that evaluation.  He was followed by a cardiologist through [facility] in [place].  Unfortunately his provider passed away.    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  He does endorse ongoing shortness of breath with exertion particularly when it is humid.  He also endorses some associated chest pressure which can occur at rest or with exertion.  He notes that if he is walking indoors for certain distance he will feel some heaviness which goes away within a few minutes of rest.  He has symptoms a few times per week.  He does report right lower extremity edema, progressive as the day goes on.  He suffered an injury to that ankle a number of years ago.  The edema improves upon awakening.  He denies any paroxysmal nocturnal dyspnea, orthopnea, acute weight gain or increasing abdominal girth.  He reports an occasional fluttering in his chest which is rare.  He denies any sustained palpitations.  He does endorse bilateral leg heaviness with ambulation.  He also reports heaviness at rest in the setting of varicose veins.    He has a known history of obstructive sleep apnea but reports this improved with weight loss.  He has not been utilizing CPAP.    He denies any family history of premature CAD or sudden cardiac death.    He does report a history of tobacco abuse smoking up to four packs of cigarettes per day.  He started when he was [age]-years-old and quit in 1990.    Problem List[1]  Past Medical History[2]  Social History     Socioeconomic History    Marital status: Single     Spouse name: Not on file    Number of children: Not on file    Years of education: Not on file    Highest education level: Not on file   Occupational History    Not on file   Tobacco Use    Smoking status: Never    Smokeless tobacco: Never   Vaping Use    Vaping status: Never Used   Substance and Sexual Activity    Alcohol use: Never    Drug use: Never    Sexual activity: Not Currently     Partners: Female     Birth control/protection: None   Other Topics Concern    Not on file   Social History Narrative    Not on file  "    Social Drivers of Health     Financial Resource Strain: Not on file   Food Insecurity: Not on file   Transportation Needs: Not on file   Physical Activity: Not on file   Stress: Not on file   Social Connections: Not on file   Intimate Partner Violence: Not on file   Housing Stability: Not on file      Family History[3]  Past Surgical History[4]  Current Medications[5]  No Known Allergies      Review of Systems:  Review of Systems   Respiratory:  Positive for chest tightness and shortness of breath.    Cardiovascular:  Positive for chest pain and leg swelling.   Neurological:  Positive for numbness.   All other systems reviewed and are negative.        Vitals:    07/21/25 0830   BP: 122/70   BP Location: Left arm   Patient Position: Sitting   Cuff Size: Large   Pulse: 72   SpO2: 95%   Weight: 111 kg (245 lb 11.2 oz)   Height: 5' 10\" (1.778 m)     Vitals:    07/21/25 0830   Weight: 111 kg (245 lb 11.2 oz)     Height: 5' 10\" (177.8 cm)     Physical Exam:  General appearance:  Appears stated age, alert, well appearing and in no distress  HEENT:  PERRLA, EOMI, no scleral icterus, no conjunctival pallor  NECK:  Supple, No elevated JVP, no thyromegaly, no carotid bruits  HEART:  Regular rate and rhythm, normal S1/S2, no S3/S4, no murmur or rub  LUNGS:  Clear to auscultation bilaterally  ABDOMEN:  Soft, non-tender, positive bowel sounds, no rebound or guarding, no organomegaly   EXTREMITIES: Right lower extremity ankle edema  VASCULAR: Diminished posterior tibial pedal pulses, bilateral varicosities noted  SKIN: No lesions or rashes on exposed skin  NEURO:  CN II-XII intact, no focal deficits         [1]   Patient Active Problem List  Diagnosis    Morbid (severe) obesity due to excess calories (HCC)    Nonobstructive atherosclerosis of coronary artery    Dyslipidemia   [2]   Past Medical History:  Diagnosis Date    Asthma     Varicose veins of bilateral lower extremities with pain    [3]   Family History  Problem " Relation Name Age of Onset    COPD Mother      Liver cancer Father     [4] No past surgical history on file.  [5]   Current Outpatient Medications:     HYDROcodone-acetaminophen (NORCO) 5-325 mg per tablet, Take 1 tablet by mouth every 6 (six) hours as needed, Disp: , Rfl:     rosuvastatin (CRESTOR) 20 MG tablet, Take 1 tablet (20 mg total) by mouth daily, Disp: 90 tablet, Rfl: 3    tamsulosin (FLOMAX) 0.4 mg, Take 0.8 mg by mouth daily at bedtime, Disp: , Rfl:     acetaminophen (TYLENOL) 500 mg tablet, Take 1 tablet (500 mg total) by mouth every 6 (six) hours as needed for mild pain or moderate pain, Disp: 30 tablet, Rfl: 0    aspirin (ECOTRIN LOW STRENGTH) 81 mg EC tablet, Take 81 mg by mouth daily (Patient not taking: Reported on 6/12/2025), Disp: , Rfl:     diazepam (VALIUM) 5 mg tablet, MRI (Patient taking differently: as needed As needed for MRI), Disp: , Rfl:     ibuprofen (MOTRIN) 400 mg tablet, Take 1 tablet (400 mg total) by mouth every 6 (six) hours as needed for mild pain or moderate pain, Disp: 30 tablet, Rfl: 0

## 2025-07-20 NOTE — ASSESSMENT & PLAN NOTE
-  30-40% mid LAD, 20% RCA - [facility] 2019 at [facility]   -  No records available, will obtain results from prior cardiologist through [facility]   -  Continues to endorse exertional shortness of breath and chest heaviness at rest and with exertion  -  Exercise nuclear stress test recommended  -  Recommend daily aspirin, does not take consistently  -  No recent assessment of lipids, prescription provided

## 2025-07-20 NOTE — ASSESSMENT & PLAN NOTE
-  No recent assessment of lipids available for review  -  Reports rosuvastatin dose escalated in recent past  -  Repeat lipid panel requested

## 2025-07-21 ENCOUNTER — OFFICE VISIT (OUTPATIENT)
Dept: CARDIOLOGY CLINIC | Facility: CLINIC | Age: 70
End: 2025-07-21
Payer: MEDICARE

## 2025-07-21 VITALS
OXYGEN SATURATION: 95 % | HEART RATE: 72 BPM | SYSTOLIC BLOOD PRESSURE: 122 MMHG | WEIGHT: 245.7 LBS | DIASTOLIC BLOOD PRESSURE: 70 MMHG | HEIGHT: 70 IN | BODY MASS INDEX: 35.18 KG/M2

## 2025-07-21 DIAGNOSIS — M62.89 EXERCISE-INDUCED LEG FATIGUE: ICD-10-CM

## 2025-07-21 DIAGNOSIS — I25.10 NONOBSTRUCTIVE ATHEROSCLEROSIS OF CORONARY ARTERY: Primary | ICD-10-CM

## 2025-07-21 DIAGNOSIS — R06.09 DOE (DYSPNEA ON EXERTION): ICD-10-CM

## 2025-07-21 DIAGNOSIS — E78.5 DYSLIPIDEMIA: ICD-10-CM

## 2025-07-21 DIAGNOSIS — E66.01 MORBID (SEVERE) OBESITY DUE TO EXCESS CALORIES (HCC): ICD-10-CM

## 2025-07-21 LAB
ATRIAL RATE: 72 BPM
P AXIS: 6 DEGREES
PR INTERVAL: 218 MS
QRS AXIS: 19 DEGREES
QRSD INTERVAL: 94 MS
QT INTERVAL: 380 MS
QTC INTERVAL: 416 MS
T WAVE AXIS: 37 DEGREES
VENTRICULAR RATE: 72 BPM

## 2025-07-21 PROCEDURE — 93000 ELECTROCARDIOGRAM COMPLETE: CPT | Performed by: INTERNAL MEDICINE

## 2025-07-21 PROCEDURE — 99204 OFFICE O/P NEW MOD 45 MIN: CPT | Performed by: INTERNAL MEDICINE

## 2025-07-21 RX ORDER — ROSUVASTATIN CALCIUM 20 MG/1
20 TABLET, COATED ORAL DAILY
Qty: 90 TABLET | Refills: 3 | Status: SHIPPED | OUTPATIENT
Start: 2025-07-21

## 2025-07-22 ENCOUNTER — APPOINTMENT (OUTPATIENT)
Dept: LAB | Facility: HOSPITAL | Age: 70
End: 2025-07-22
Payer: MEDICARE

## 2025-07-22 DIAGNOSIS — E78.5 DYSLIPIDEMIA: ICD-10-CM

## 2025-07-22 LAB
CHOLEST SERPL-MCNC: 116 MG/DL (ref ?–200)
HDLC SERPL-MCNC: 35 MG/DL
LDLC SERPL CALC-MCNC: 61 MG/DL (ref 0–100)
TRIGL SERPL-MCNC: 100 MG/DL (ref ?–150)

## 2025-07-22 PROCEDURE — 80061 LIPID PANEL: CPT

## 2025-07-22 PROCEDURE — 36415 COLL VENOUS BLD VENIPUNCTURE: CPT

## 2025-07-23 ENCOUNTER — HOSPITAL ENCOUNTER (OUTPATIENT)
Dept: NON INVASIVE DIAGNOSTICS | Age: 70
Discharge: HOME | End: 2025-07-23
Attending: INTERNAL MEDICINE
Payer: MEDICARE

## 2025-07-23 DIAGNOSIS — E66.01 MORBID (SEVERE) OBESITY DUE TO EXCESS CALORIES (HCC): ICD-10-CM

## 2025-07-23 PROCEDURE — 93922 UPR/L XTREMITY ART 2 LEVELS: CPT | Performed by: STUDENT IN AN ORGANIZED HEALTH CARE EDUCATION/TRAINING PROGRAM

## 2025-07-23 PROCEDURE — 93925 LOWER EXTREMITY STUDY: CPT | Performed by: STUDENT IN AN ORGANIZED HEALTH CARE EDUCATION/TRAINING PROGRAM

## 2025-07-23 PROCEDURE — 93925 LOWER EXTREMITY STUDY: CPT

## 2025-07-23 PROCEDURE — 93923 UPR/LXTR ART STDY 3+ LVLS: CPT

## 2025-08-11 ENCOUNTER — HOSPITAL ENCOUNTER (OUTPATIENT)
Dept: NUCLEAR MEDICINE | Facility: HOSPITAL | Age: 70
Discharge: HOME | End: 2025-08-11
Attending: INTERNAL MEDICINE
Payer: MEDICARE

## 2025-08-11 ENCOUNTER — HOSPITAL ENCOUNTER (OUTPATIENT)
Dept: NON INVASIVE DIAGNOSTICS | Facility: HOSPITAL | Age: 70
Discharge: HOME | End: 2025-08-11
Attending: INTERNAL MEDICINE
Payer: MEDICARE

## 2025-08-12 ENCOUNTER — TELEPHONE (OUTPATIENT)
Age: 70
End: 2025-08-12

## 2025-08-13 ENCOUNTER — TELEPHONE (OUTPATIENT)
Dept: CARDIOLOGY CLINIC | Facility: CLINIC | Age: 70
End: 2025-08-13

## 2025-08-13 ENCOUNTER — PREP FOR PROCEDURE (OUTPATIENT)
Dept: CARDIOLOGY CLINIC | Facility: CLINIC | Age: 70
End: 2025-08-13

## 2025-08-14 ENCOUNTER — APPOINTMENT (OUTPATIENT)
Dept: LAB | Facility: HOSPITAL | Age: 70
End: 2025-08-14
Payer: MEDICARE

## 2025-08-19 ENCOUNTER — HOSPITAL ENCOUNTER (OUTPATIENT)
Dept: CT IMAGING | Facility: HOSPITAL | Age: 70
Discharge: HOME | End: 2025-08-19
Attending: INTERNAL MEDICINE
Payer: MEDICARE

## 2025-08-19 DIAGNOSIS — R91.1 LUNG NODULE SEEN ON IMAGING STUDY: ICD-10-CM

## 2025-08-19 PROCEDURE — 71250 CT THORAX DX C-: CPT

## 2025-08-22 PROBLEM — Z95.5 S/P DRUG ELUTING CORONARY STENT PLACEMENT: Status: ACTIVE | Noted: 2025-08-22
